# Patient Record
Sex: MALE | Race: WHITE | NOT HISPANIC OR LATINO | Employment: FULL TIME | RURAL
[De-identification: names, ages, dates, MRNs, and addresses within clinical notes are randomized per-mention and may not be internally consistent; named-entity substitution may affect disease eponyms.]

---

## 2022-06-07 ENCOUNTER — TELEPHONE (OUTPATIENT)
Dept: GASTROENTEROLOGY | Facility: CLINIC | Age: 47
End: 2022-06-07
Payer: OTHER GOVERNMENT

## 2022-06-08 ENCOUNTER — TELEPHONE (OUTPATIENT)
Dept: GASTROENTEROLOGY | Facility: CLINIC | Age: 47
End: 2022-06-08
Payer: OTHER GOVERNMENT

## 2022-06-30 ENCOUNTER — OFFICE VISIT (OUTPATIENT)
Dept: GASTROENTEROLOGY | Facility: CLINIC | Age: 47
End: 2022-06-30
Payer: OTHER GOVERNMENT

## 2022-06-30 VITALS
DIASTOLIC BLOOD PRESSURE: 76 MMHG | HEART RATE: 67 BPM | HEIGHT: 69 IN | OXYGEN SATURATION: 97 % | WEIGHT: 174.38 LBS | SYSTOLIC BLOOD PRESSURE: 120 MMHG | BODY MASS INDEX: 25.83 KG/M2

## 2022-06-30 DIAGNOSIS — R19.7 DIARRHEA, UNSPECIFIED TYPE: ICD-10-CM

## 2022-06-30 DIAGNOSIS — Z90.49 HISTORY OF COLON RESECTION: ICD-10-CM

## 2022-06-30 PROCEDURE — 99204 OFFICE O/P NEW MOD 45 MIN: CPT | Mod: ,,, | Performed by: NURSE PRACTITIONER

## 2022-06-30 PROCEDURE — 99204 PR OFFICE/OUTPT VISIT, NEW, LEVL IV, 45-59 MIN: ICD-10-PCS | Mod: ,,, | Performed by: NURSE PRACTITIONER

## 2022-06-30 RX ORDER — TESTOSTERONE CYPIONATE 200 MG/ML
400 INJECTION, SOLUTION INTRAMUSCULAR
COMMUNITY
Start: 2022-01-26

## 2022-06-30 RX ORDER — TRAZODONE HYDROCHLORIDE 100 MG/1
100 TABLET ORAL 2 TIMES DAILY
COMMUNITY
Start: 2022-01-12

## 2022-06-30 RX ORDER — OMEPRAZOLE 20 MG/1
20 CAPSULE, DELAYED RELEASE ORAL 2 TIMES DAILY
COMMUNITY
Start: 2022-05-09 | End: 2022-08-18 | Stop reason: ALTCHOICE

## 2022-06-30 RX ORDER — CYANOCOBALAMIN 1000 UG/ML
1000 INJECTION, SOLUTION INTRAMUSCULAR; SUBCUTANEOUS
COMMUNITY
Start: 2022-01-18

## 2022-06-30 RX ORDER — TIZANIDINE 4 MG/1
TABLET ORAL
COMMUNITY
Start: 2022-06-07

## 2022-06-30 RX ORDER — TRAMADOL HYDROCHLORIDE 50 MG/1
50 TABLET ORAL 3 TIMES DAILY PRN
COMMUNITY
Start: 2022-06-07

## 2022-06-30 RX ORDER — CYCLOBENZAPRINE HCL 10 MG
10 TABLET ORAL 3 TIMES DAILY PRN
COMMUNITY

## 2022-06-30 RX ORDER — DOXYCYCLINE 100 MG/1
100 CAPSULE ORAL DAILY
COMMUNITY
Start: 2022-06-07

## 2022-06-30 RX ORDER — PAROXETINE HYDROCHLORIDE 20 MG/1
20 TABLET, FILM COATED ORAL DAILY
COMMUNITY
Start: 2022-06-29

## 2022-06-30 RX ORDER — TAMSULOSIN HYDROCHLORIDE 0.4 MG/1
1 CAPSULE ORAL DAILY
COMMUNITY
Start: 2022-06-07

## 2022-06-30 RX ORDER — MONTELUKAST SODIUM 10 MG/1
10 TABLET ORAL
COMMUNITY

## 2022-06-30 NOTE — PROGRESS NOTES
"Tyrel Stafford is a 47 y.o. male here for Referral (Patient comes in for chronic diarrhea, bloating, lower abd pain, and gas.)        PCP: Primary Doctor No  Referring Provider: No referring provider defined for this encounter.     HPI:  Presents for report of chronic diarrhea. States this is an ongoing problem. History of colon resection in 2016. States his colon was "twisted" and that he became septic. Surgery was performed at [facility]. States that he has also had diarrhea but that this has gotten progressively worse. Denies hematochezia. States that bowel movements do have various colors. Reports occasional nocturnal diarrhea with fecal incontinence. Does have gas bloating.Reports that diarrhea is associated with lower abdominal pressure. No family history of IBD or Celiac disease in the immediate family. Had a colonoscopy with Dr. La 2-3 years ago.        ROS:  Review of Systems   Constitutional: Positive for appetite change (decreased). Negative for fatigue, fever and unexpected weight change.   HENT: Negative for trouble swallowing.    Gastrointestinal: Positive for diarrhea and fecal incontinence. Negative for abdominal pain (pressure lower abdomen), blood in stool, change in bowel habit, constipation, nausea, vomiting, reflux and change in bowel habit. Abdominal distention: gas bloating.   Neurological: Negative for light-headedness.   Psychiatric/Behavioral: The patient is not nervous/anxious.           PMHX:  has a past medical history of Arthritis, Broken ribs, Hypertension, and Lung injury.    PSHX:  has a past surgical history that includes Sinus surgery; Ankle surgery; Abdominal surgery; and vesectomy.    PFHX: family history includes Cancer in his father and mother; Diabetes in his father and mother; Hypertension in his father and mother.    PSlHX:  reports that he has quit smoking. His smoking use included vaping with nicotine. He has never used smokeless tobacco. He reports that he does not " "drink alcohol and does not use drugs.        Review of patient's allergies indicates:   Allergen Reactions    Codeine Other (See Comments)       Medication List with Changes/Refills   Current Medications    CYANOCOBALAMIN 1,000 MCG/ML INJECTION    Inject 1,000 mcg into the muscle every 30 days.    CYCLOBENZAPRINE (FLEXERIL) 10 MG TABLET    Take 10 mg by mouth 3 (three) times daily as needed.    DOXYCYCLINE (VIBRAMYCIN) 100 MG CAP    Take 100 mg by mouth once daily.    MONTELUKAST (SINGULAIR) 10 MG TABLET    Take 10 mg by mouth.    OMEPRAZOLE (PRILOSEC) 20 MG CAPSULE    Take 20 mg by mouth 2 (two) times daily.    PAROXETINE (PAXIL) 20 MG TABLET    Take 20 mg by mouth once daily.    TAMSULOSIN (FLOMAX) 0.4 MG CAP    Take 1 capsule by mouth once daily.    TESTOSTERONE CYPIONATE (DEPOTESTOTERONE CYPIONATE) 200 MG/ML INJECTION    Inject 400 mg into the muscle every 14 (fourteen) days.    TIZANIDINE (ZANAFLEX) 4 MG TABLET    Take by mouth.    TRAMADOL (ULTRAM) 50 MG TABLET    Take 50 mg by mouth 3 (three) times daily as needed.    TRAZODONE (DESYREL) 100 MG TABLET    Take 100 mg by mouth 2 (two) times daily.        Objective Findings:  Vital Signs:  /76   Pulse 67   Ht 5' 9" (1.753 m)   Wt 79.1 kg (174 lb 6.4 oz)   SpO2 97%   BMI 25.75 kg/m²  Body mass index is 25.75 kg/m².    Physical Exam:  Physical Exam  Vitals and nursing note reviewed.   Constitutional:       General: He is not in acute distress.     Appearance: Normal appearance. He is not ill-appearing.   HENT:      Mouth/Throat:      Mouth: Mucous membranes are moist.   Cardiovascular:      Rate and Rhythm: Normal rate.   Pulmonary:      Breath sounds: No wheezing, rhonchi or rales.   Abdominal:      General: Bowel sounds are normal. There is no distension.      Palpations: Abdomen is soft. There is no mass.      Tenderness: There is no abdominal tenderness. There is no guarding or rebound.      Hernia: No hernia is present.   Skin:     General: Skin " is warm and dry.      Coloration: Skin is not jaundiced or pale.      Findings: No bruising.   Neurological:      Mental Status: He is alert and oriented to person, place, and time.   Psychiatric:         Mood and Affect: Mood normal.          Labs:  No results found for: WBC, HGB, HCT, MCV, RDW, PLT, GRAN, LYMPH, MONO, EOS, BASO  No results found for: NA, K, CL, CO2, GLU, BUN, CREATININE, CALCIUM, PROT, ALBUMIN, BILITOT, ALKPHOS, AST, ALT      Imaging: No results found.      Assessment:  Tyrel Stafford is a 47 y.o. male here with:  1. Diarrhea, unspecified type    2. History of colon resection          Recommendations:  1. Stool studies and labs  2. Request records from Dr. La and [facility]  3. Avoid NSAID's    Follow up in about 6 weeks (around 8/11/2022).      Order summary:  Orders Placed This Encounter    C Diff Toxin by PCR    Enteric Pathogen Panel    Fecal leukocytes    Giardia antigen    Calprotectin, Stool    Occult blood x 1, stool    Fecal fat, qualitative    IgA    Tissue Transglutaminase Ab, IgA    Endomysial antibody, IgA titer    Gliadin (Deamidated) Ab, Eval    C-Reactive Protein       Thank you for allowing me to participate in the care of Tyrel Stafford.      ISI Moncada

## 2022-07-06 ENCOUNTER — TELEPHONE (OUTPATIENT)
Dept: GASTROENTEROLOGY | Facility: CLINIC | Age: 47
End: 2022-07-06
Payer: OTHER GOVERNMENT

## 2022-07-06 NOTE — TELEPHONE ENCOUNTER
Attempted to call results. Left message.    ----- Message from FARZANEH Sarmiento sent at 7/6/2022  4:40 PM CDT -----  No celiac disease on lab. Stool studies are not done.

## 2022-07-07 ENCOUNTER — TELEPHONE (OUTPATIENT)
Dept: GASTROENTEROLOGY | Facility: CLINIC | Age: 47
End: 2022-07-07
Payer: OTHER GOVERNMENT

## 2022-07-07 LAB — OCCULT BLOOD: NEGATIVE

## 2022-07-07 PROCEDURE — 82272 OCCULT BLD FECES 1-3 TESTS: CPT | Mod: ,,, | Performed by: CLINICAL MEDICAL LABORATORY

## 2022-07-07 PROCEDURE — 82272 OCCULT BLOOD X 1, STOOL: ICD-10-PCS | Mod: ,,, | Performed by: CLINICAL MEDICAL LABORATORY

## 2022-07-07 NOTE — TELEPHONE ENCOUNTER
Called results to patient. Verbalized understanding. Patient states he is taking stool sample to lab today.    ----- Message from FARZANEH Sarmiento sent at 7/6/2022  4:40 PM CDT -----  No celiac disease on lab. Stool studies are not done.

## 2022-07-12 ENCOUNTER — TELEPHONE (OUTPATIENT)
Dept: GASTROENTEROLOGY | Facility: CLINIC | Age: 47
End: 2022-07-12
Payer: OTHER GOVERNMENT

## 2022-07-12 DIAGNOSIS — Z90.49 HISTORY OF COLON RESECTION: ICD-10-CM

## 2022-07-12 DIAGNOSIS — R19.7 DIARRHEA, UNSPECIFIED TYPE: Primary | ICD-10-CM

## 2022-07-12 NOTE — TELEPHONE ENCOUNTER
Results and recommendations called to patient. Agreeable to colonoscopy. Placed on hold for  to schedule    ----- Message from FARZANEH Sarmiento sent at 7/12/2022 11:30 AM CDT -----  Abnormal fatty acid in stool. Recommend colonoscopy due to history of colon resection and chronic diarrhea

## 2022-08-11 ENCOUNTER — HOSPITAL ENCOUNTER (OUTPATIENT)
Dept: GASTROENTEROLOGY | Facility: HOSPITAL | Age: 47
Discharge: HOME | End: 2022-08-11
Attending: NURSE PRACTITIONER
Payer: OTHER GOVERNMENT

## 2022-08-11 ENCOUNTER — ANESTHESIA EVENT (OUTPATIENT)
Dept: GASTROENTEROLOGY | Facility: HOSPITAL | Age: 47
End: 2022-08-11
Payer: OTHER GOVERNMENT

## 2022-08-11 ENCOUNTER — ANESTHESIA (OUTPATIENT)
Dept: GASTROENTEROLOGY | Facility: HOSPITAL | Age: 47
End: 2022-08-11
Payer: OTHER GOVERNMENT

## 2022-08-11 VITALS
SYSTOLIC BLOOD PRESSURE: 120 MMHG | HEART RATE: 63 BPM | OXYGEN SATURATION: 97 % | RESPIRATION RATE: 13 BRPM | DIASTOLIC BLOOD PRESSURE: 69 MMHG | TEMPERATURE: 98 F

## 2022-08-11 DIAGNOSIS — Z90.49 HISTORY OF COLON RESECTION: ICD-10-CM

## 2022-08-11 DIAGNOSIS — R19.7 DIARRHEA, UNSPECIFIED TYPE: ICD-10-CM

## 2022-08-11 DIAGNOSIS — K57.30 COLON, DIVERTICULOSIS: ICD-10-CM

## 2022-08-11 DIAGNOSIS — K63.5 POLYP OF TRANSVERSE COLON, UNSPECIFIED TYPE: ICD-10-CM

## 2022-08-11 DIAGNOSIS — Z12.11 SCREENING FOR MALIGNANT NEOPLASM OF COLON: ICD-10-CM

## 2022-08-11 PROCEDURE — 43239 EGD BIOPSY SINGLE/MULTIPLE: CPT

## 2022-08-11 PROCEDURE — 00811 ANES LWR INTST NDSC NOS: CPT

## 2022-08-11 PROCEDURE — D9220A PRA ANESTHESIA: ICD-10-PCS | Mod: 33,,, | Performed by: NURSE ANESTHETIST, CERTIFIED REGISTERED

## 2022-08-11 PROCEDURE — 63600175 PHARM REV CODE 636 W HCPCS: Performed by: NURSE ANESTHETIST, CERTIFIED REGISTERED

## 2022-08-11 PROCEDURE — D9220A PRA ANESTHESIA: Mod: 33,,, | Performed by: NURSE ANESTHETIST, CERTIFIED REGISTERED

## 2022-08-11 PROCEDURE — 88305 SURGICAL PATHOLOGY: ICD-10-PCS | Mod: 26,XU,, | Performed by: PATHOLOGY

## 2022-08-11 PROCEDURE — 88360 TUMOR IMMUNOHISTOCHEM/MANUAL: CPT | Mod: 26,,, | Performed by: PATHOLOGY

## 2022-08-11 PROCEDURE — 88305 TISSUE EXAM BY PATHOLOGIST: CPT | Mod: 26,,, | Performed by: PATHOLOGY

## 2022-08-11 PROCEDURE — 37000009 HC ANESTHESIA EA ADD 15 MINS

## 2022-08-11 PROCEDURE — 45380 COLONOSCOPY AND BIOPSY: CPT | Mod: 33

## 2022-08-11 PROCEDURE — 27201423 OPTIME MED/SURG SUP & DEVICES STERILE SUPPLY

## 2022-08-11 PROCEDURE — 88305 TISSUE EXAM BY PATHOLOGIST: CPT | Mod: SUR | Performed by: INTERNAL MEDICINE

## 2022-08-11 PROCEDURE — 88360 SURGICAL PATHOLOGY: ICD-10-PCS | Mod: 26,XU,, | Performed by: PATHOLOGY

## 2022-08-11 PROCEDURE — 25000003 PHARM REV CODE 250: Performed by: NURSE ANESTHETIST, CERTIFIED REGISTERED

## 2022-08-11 PROCEDURE — 37000008 HC ANESTHESIA 1ST 15 MINUTES

## 2022-08-11 RX ORDER — SODIUM CHLORIDE 0.9 % (FLUSH) 0.9 %
10 SYRINGE (ML) INJECTION
Status: DISCONTINUED | OUTPATIENT
Start: 2022-08-11 | End: 2022-08-12 | Stop reason: HOSPADM

## 2022-08-11 RX ORDER — PROPOFOL 10 MG/ML
VIAL (ML) INTRAVENOUS
Status: DISCONTINUED | OUTPATIENT
Start: 2022-08-11 | End: 2022-08-11

## 2022-08-11 RX ORDER — LIDOCAINE HYDROCHLORIDE 20 MG/ML
INJECTION INTRAVENOUS
Status: DISCONTINUED | OUTPATIENT
Start: 2022-08-11 | End: 2022-08-11

## 2022-08-11 RX ADMIN — PROPOFOL 75 MG: 10 INJECTION, EMULSION INTRAVENOUS at 02:08

## 2022-08-11 RX ADMIN — LIDOCAINE HYDROCHLORIDE 75 MG: 20 INJECTION, SOLUTION INTRAVENOUS at 02:08

## 2022-08-11 RX ADMIN — SODIUM CHLORIDE: 9 INJECTION, SOLUTION INTRAVENOUS at 02:08

## 2022-08-11 NOTE — H&P
[facility] - Endoscopy  Gastroenterology  H&P    Patient Name: Tyrel Stafford  MRN: [id number]  Admission Date: 8/11/2022  Code Status: Full Code    Attending Provider: Obdulio Box MD  Primary Care Physician: Primary Doctor No  Principal Problem:<principal problem not specified>    Subjective:     History of Present Illness: Pt presents for colonoscopy due to diarrhea with history of partial colectomy at [facility]. His last colonscopy was at [facility] approximately two to three years ago.    Past Medical History:   Diagnosis Date    Arthritis     Broken ribs     Hypertension     Lung injury        Past Surgical History:   Procedure Laterality Date    ABDOMINAL SURGERY      ANKLE SURGERY      SINUS SURGERY      vesectomy         Review of patient's allergies indicates:   Allergen Reactions    Codeine Other (See Comments)     Family History     Problem Relation (Age of Onset)    Cancer Mother, Father    Diabetes Mother, Father    Hypertension Mother, Father        Tobacco Use    Smoking status: Former Smoker     Types: Vaping with nicotine    Smokeless tobacco: Never Used   Substance and Sexual Activity    Alcohol use: Never    Drug use: Never    Sexual activity: Not on file     Review of Systems   Respiratory: Negative.    Cardiovascular: Negative.    Gastrointestinal: Positive for diarrhea.     Objective:     Vital Signs (Most Recent):  Temp: 97.9 °F (36.6 °C) (08/11/22 1258)  Pulse: 70 (08/11/22 1258)  Resp: 10 (08/11/22 1258)  BP: 127/78 (08/11/22 1258)  SpO2: 96 % (08/11/22 1258) Vital Signs (24h Range):  Temp:  [97.9 °F (36.6 °C)] 97.9 °F (36.6 °C)  Pulse:  [70] 70  Resp:  [10] 10  SpO2:  [96 %] 96 %  BP: (127)/(78) 127/78        There is no height or weight on file to calculate BMI.    No intake or output data in the 24 hours ending 08/11/22 1418    Lines/Drains/Airways     Peripheral Intravenous Line  Duration                Peripheral IV - Single Lumen 08/11/22 1257 22 G Right Antecubital <1 day                 Physical Exam  Vitals reviewed.   Constitutional:       General: He is not in acute distress.     Appearance: Normal appearance. He is well-developed. He is not ill-appearing.   HENT:      Head: Normocephalic and atraumatic.      Nose: Nose normal.   Eyes:      Pupils: Pupils are equal, round, and reactive to light.   Cardiovascular:      Rate and Rhythm: Normal rate and regular rhythm.   Pulmonary:      Effort: Pulmonary effort is normal.      Breath sounds: Normal breath sounds. No wheezing.   Abdominal:      General: Abdomen is flat. Bowel sounds are normal. There is no distension.      Palpations: Abdomen is soft.      Tenderness: There is no abdominal tenderness. There is no guarding.   Skin:     General: Skin is warm and dry.      Coloration: Skin is not jaundiced.   Neurological:      Mental Status: He is alert.   Psychiatric:         Attention and Perception: Attention normal.         Mood and Affect: Affect normal.         Speech: Speech normal.         Behavior: Behavior is cooperative.      Comments: Pt was calm while speaking.         Significant Labs:  CBC: No results for input(s): WBC, HGB, HCT, PLT in the last 48 hours.  CMP: No results for input(s): GLU, CALCIUM, ALBUMIN, PROT, NA, K, CO2, CL, BUN, CREATININE, ALKPHOS, ALT, AST, BILITOT in the last 48 hours.    Significant Imaging:  Imaging results within the past 24 hours have been reviewed.    Assessment/Plan:     There are no hospital problems to display for this patient.        Imp: diarrhea; screen for colon neoplasm  Plan: colonoscopy    Obdulio Box MD  Gastroenterology  [facility] - Endoscopy

## 2022-08-11 NOTE — ANESTHESIA PREPROCEDURE EVALUATION
08/11/2022  Tyrel Stafford is a 47 y.o., male.      Pre-op Assessment    I have reviewed the Patient Summary Reports.    I have reviewed the NPO Status.   I have reviewed the Medications.     Review of Systems  Anesthesia Hx:  No problems with previous Anesthesia    Social:  Former Smoker    Hematology/Oncology:  Hematology Normal   Oncology Normal     EENT/Dental:EENT/Dental Normal   Cardiovascular:   Exercise tolerance: good Hypertension NYHA Classification II    Pulmonary:  Pulmonary Normal    Renal/:  Renal/ Normal     Hepatic/GI:   GERD    Musculoskeletal:  Musculoskeletal Normal    Neurological:  Neurology Normal    Endocrine:  Endocrine Normal    Dermatological:  Skin Normal    Psych:   depression        Past Medical History:   Diagnosis Date    Arthritis     Broken ribs     Hypertension     Lung injury        Physical Exam  General: Well nourished, Cooperative and Alert    Airway:  Mallampati: II   Mouth Opening: Normal  TM Distance: Normal  Tongue: Normal  Neck ROM: Normal ROM    Dental:  Intact    Chest/Lungs:  Normal Respiratory Rate    Heart:  Rate: Normal  Rhythm: Regular Rhythm        Anesthesia Plan  Type of Anesthesia, risks & benefits discussed:    Anesthesia Type: Gen Natural Airway  Intra-op Monitoring Plan: Standard ASA Monitors  Post Op Pain Control Plan: multimodal analgesia  Induction:  IV  Informed Consent: Informed consent signed with the Patient and all parties understand the risks and agree with anesthesia plan.  All questions answered. Patient consented to blood products? Yes  ASA Score: 2  Day of Surgery Review of History & Physical: I have interviewed and examined the patient. I have reviewed the patient's H&P dated:     Ready For Surgery From Anesthesia Perspective.      Current Outpatient Medications   Medication Sig    cyanocobalamin 1,000 mcg/mL injection Inject  1,000 mcg into the muscle every 30 days.    cyclobenzaprine (FLEXERIL) 10 MG tablet Take 10 mg by mouth 3 (three) times daily as needed.    doxycycline (VIBRAMYCIN) 100 MG Cap Take 100 mg by mouth once daily.    montelukast (SINGULAIR) 10 mg tablet Take 10 mg by mouth.    omeprazole (PRILOSEC) 20 MG capsule Take 20 mg by mouth 2 (two) times daily.    paroxetine (PAXIL) 20 MG tablet Take 20 mg by mouth once daily.    tamsulosin (FLOMAX) 0.4 mg Cap Take 1 capsule by mouth once daily.    testosterone cypionate (DEPOTESTOTERONE CYPIONATE) 200 mg/mL injection Inject 400 mg into the muscle every 14 (fourteen) days.    tiZANidine (ZANAFLEX) 4 MG tablet Take by mouth.    traMADoL (ULTRAM) 50 mg tablet Take 50 mg by mouth 3 (three) times daily as needed.    traZODone (DESYREL) 100 MG tablet Take 100 mg by mouth 2 (two) times daily.     No current facility-administered medications for this encounter.     Social History     Socioeconomic History    Marital status:    Tobacco Use    Smoking status: Former Smoker     Types: Vaping with nicotine    Smokeless tobacco: Never Used   Substance and Sexual Activity    Alcohol use: Never    Drug use: Never

## 2022-08-11 NOTE — ADDENDUM NOTE
Addendum  created 08/11/22 1529 by Nomi Esqueda CRNA    Intraprocedure Staff edited, Review and Sign - Ready for Procedure, Review and Sign - Undo

## 2022-08-11 NOTE — DISCHARGE INSTRUCTIONS
Procedure Date  8/11/22     Impression  Overall Impression: Some retained stool was present. No gross colitis was seen. Scattered diverticula were present in the sigmoid, descending and ascending colon. One diminutive polyp was removed with biopsy from the transverse colon.     Recommendation  Await pathology results  Repeat colonoscopy in 5 years, pending pathology results; high fiber diet.     Indication  History of colon resection, Diarrhea, unspecified type    DO NOT DRIVE FOR 24 HOURS OR OPERATE HEAVY MACHINERY.   DO NOT SIGN LEGAL DOCUMENTS.  DRINK PLENTY OF FLUIDS. RESUME DIET.

## 2022-08-11 NOTE — TRANSFER OF CARE
Anesthesia Transfer of Care Note    Patient: Tyrel Barrier    Procedure(s) Performed: *coloonoscopy  Patient location: GI    Anesthesia Type: general    Transport from OR: Transported from OR on room air with adequate spontaneous ventilation. Continuous ECG monitoring in transport. Continuous SpO2 monitoring in transport    Post pain: adequate analgesia    Post assessment: no apparent anesthetic complications    Post vital signs: stable    Level of consciousness: sedated and responds to stimulation    Nausea/Vomiting: no nausea/vomiting    Complications: none    Transfer of care protocol was followedComments: Good SV continue, NAD, VSS, RTRN      Last vitals:   Visit Vitals  /76 (BP Location: Right arm, Patient Position: Lying)   Pulse 70   Temp 36.7 °C (98 °F)   Resp 18   SpO2 98%

## 2022-08-11 NOTE — ANESTHESIA POSTPROCEDURE EVALUATION
Anesthesia Post Evaluation    Patient: Tyrel Barrier    Procedure(s) Performed: *colonoscopy    Final Anesthesia Type: general      Patient location during evaluation: GI PACU  Patient participation: Yes- Able to Participate  Level of consciousness: awake and alert  Post-procedure vital signs: reviewed and stable  Pain management: adequate  Airway patency: patent    PONV status at discharge: No PONV  Anesthetic complications: no      Cardiovascular status: blood pressure returned to baseline and hemodynamically stable  Respiratory status: spontaneous ventilation  Hydration status: euvolemic  Follow-up not needed.  Comments: Pt voices appreciation for care          Vitals Value Taken Time   /69 08/11/22 1508   Temp 36.7 °C (98 °F) 08/11/22 1437   Pulse 67 08/11/22 1511   Resp 12 08/11/22 1511   SpO2 97 % 08/11/22 1510   Vitals shown include unvalidated device data.      Event Time   Out of Recovery 15:21:00         Pain/Sharee Score: Sharee Score: 10 (8/11/2022  3:00 PM)

## 2022-08-12 LAB
DHEA SERPL-MCNC: NORMAL
ESTROGEN SERPL-MCNC: NORMAL PG/ML
INSULIN SERPL-ACNC: NORMAL U[IU]/ML
LAB AP GROSS DESCRIPTION: NORMAL
LAB AP LABORATORY NOTES: NORMAL
T3RU NFR SERPL: NORMAL %

## 2022-08-16 ENCOUNTER — TELEPHONE (OUTPATIENT)
Dept: GASTROENTEROLOGY | Facility: CLINIC | Age: 47
End: 2022-08-16
Payer: OTHER GOVERNMENT

## 2022-08-16 NOTE — TELEPHONE ENCOUNTER
Called patient to discuss results and verbalized understanding.      ----- Message from Obdulio Box MD sent at 8/12/2022  3:47 PM CDT -----  Place pt on list for colonoscopy in 5 years due to poor prep. The colon polyp was hyperplastic and the colon biopsies were normal. Offer pt a return to GI clinic appt to consider anti-diarrheal therapy.  He may use OTC fiber supplement and immodium, too.

## 2022-08-18 ENCOUNTER — OFFICE VISIT (OUTPATIENT)
Dept: GASTROENTEROLOGY | Facility: CLINIC | Age: 47
End: 2022-08-18
Payer: OTHER GOVERNMENT

## 2022-08-18 VITALS
HEIGHT: 69 IN | HEART RATE: 65 BPM | DIASTOLIC BLOOD PRESSURE: 84 MMHG | BODY MASS INDEX: 25.21 KG/M2 | OXYGEN SATURATION: 97 % | SYSTOLIC BLOOD PRESSURE: 134 MMHG | WEIGHT: 170.19 LBS

## 2022-08-18 DIAGNOSIS — K90.9 STEATORRHEA: ICD-10-CM

## 2022-08-18 DIAGNOSIS — R19.7 DIARRHEA, UNSPECIFIED TYPE: Primary | ICD-10-CM

## 2022-08-18 PROCEDURE — 99214 PR OFFICE/OUTPT VISIT, EST, LEVL IV, 30-39 MIN: ICD-10-PCS | Mod: ,,, | Performed by: NURSE PRACTITIONER

## 2022-08-18 PROCEDURE — 99214 OFFICE O/P EST MOD 30 MIN: CPT | Mod: ,,, | Performed by: NURSE PRACTITIONER

## 2022-08-18 RX ORDER — PANCRELIPASE 36000; 180000; 114000 [USP'U]/1; [USP'U]/1; [USP'U]/1
1 CAPSULE, DELAYED RELEASE PELLETS ORAL 3 TIMES DAILY
Qty: 90 CAPSULE | Refills: 1 | Status: SHIPPED | OUTPATIENT
Start: 2022-08-18 | End: 2022-10-17

## 2022-08-18 RX ORDER — DOXYCYCLINE 50 MG/1
CAPSULE ORAL
COMMUNITY

## 2022-08-18 RX ORDER — FAMOTIDINE 20 MG/1
20 TABLET, FILM COATED ORAL 2 TIMES DAILY
Qty: 60 TABLET | Refills: 1 | Status: SHIPPED | OUTPATIENT
Start: 2022-08-18 | End: 2022-09-15

## 2022-08-18 RX ORDER — SILDENAFIL CITRATE 20 MG/1
TABLET ORAL
COMMUNITY
Start: 2022-07-28 | End: 2023-06-19 | Stop reason: ALTCHOICE

## 2022-08-18 NOTE — PROGRESS NOTES
Tyrel Stafford is a 47 y.o. male here for Follow-up        PCP: Scout Foster  Referring Provider: No referring provider defined for this encounter.     HPI:  Presents for follow up after colonoscopy. No colitis on biopsy. He did have retained stool in the colon. Recommendation to repeat in 5 years. Fiber supplement and Imodium recommended. Stool studies reviewed. He does have abnormal fatty acid in the stool. Denies fish oil supplements. Diarrhea has been ongoing for 3 years. Has had colon resection in 2016.         ROS:  Review of Systems   Constitutional: Negative for activity change, appetite change, fatigue, fever and unexpected weight change.   HENT: Negative for trouble swallowing.    Respiratory: Negative for cough.    Cardiovascular: Negative for chest pain.   Gastrointestinal: Positive for diarrhea. Negative for abdominal distention, abdominal pain, blood in stool, change in bowel habit, constipation, nausea, vomiting, reflux and change in bowel habit.   Musculoskeletal: Negative for gait problem.   Integumentary:  Negative for color change.   Neurological: Negative for dizziness.   Psychiatric/Behavioral: Negative for sleep disturbance. The patient is not nervous/anxious.           PMHX:  has a past medical history of Arthritis, Broken ribs, Hypertension, Lung injury, Polyp of transverse colon (8/11/2022), and Screening for malignant neoplasm of colon (8/11/2022).    PSHX:  has a past surgical history that includes Sinus surgery; Ankle surgery; Abdominal surgery; and vesectomy.    PFHX: family history includes Cancer in his father and mother; Diabetes in his father and mother; Hypertension in his father and mother.    PSlHX:  reports that he has quit smoking. His smoking use included vaping with nicotine. He has never used smokeless tobacco. He reports that he does not drink alcohol and does not use drugs.        Review of patient's allergies indicates:   Allergen Reactions    Codeine Other (See  "Comments)    Hydrocodone        Medication List with Changes/Refills   New Medications    FAMOTIDINE (PEPCID) 20 MG TABLET    Take 1 tablet (20 mg total) by mouth 2 (two) times daily.    LIPASE-PROTEASE-AMYLASE (CREON) 36,000-114,000- 180,000 UNIT CPDR    Take 1 capsule by mouth 3 (three) times daily.   Current Medications    CYANOCOBALAMIN 1,000 MCG/ML INJECTION    Inject 1,000 mcg into the muscle every 30 days.    CYCLOBENZAPRINE (FLEXERIL) 10 MG TABLET    Take 10 mg by mouth 3 (three) times daily as needed.    DOXYCYCLINE (MONODOX) 50 MG CAP    TAKE 2 CAPSULES BY MOUTH DAILY FOR 14 DAYS    DOXYCYCLINE (VIBRAMYCIN) 100 MG CAP    Take 100 mg by mouth once daily.    MONTELUKAST (SINGULAIR) 10 MG TABLET    Take 10 mg by mouth.    PAROXETINE (PAXIL) 20 MG TABLET    Take 20 mg by mouth once daily.    SILDENAFIL (REVATIO) 20 MG TAB        TAMSULOSIN (FLOMAX) 0.4 MG CAP    Take 1 capsule by mouth once daily.    TESTOSTERONE CYPIONATE (DEPOTESTOTERONE CYPIONATE) 200 MG/ML INJECTION    Inject 400 mg into the muscle every 14 (fourteen) days.    TIZANIDINE (ZANAFLEX) 4 MG TABLET    Take by mouth.    TRAMADOL (ULTRAM) 50 MG TABLET    Take 50 mg by mouth 3 (three) times daily as needed.    TRAZODONE (DESYREL) 100 MG TABLET    Take 100 mg by mouth 2 (two) times daily.   Discontinued Medications    OMEPRAZOLE (PRILOSEC) 20 MG CAPSULE    Take 20 mg by mouth 2 (two) times daily.        Objective Findings:  Vital Signs:  /84   Pulse 65   Ht 5' 9" (1.753 m)   Wt 77.2 kg (170 lb 3.2 oz)   SpO2 97%   BMI 25.13 kg/m²  Body mass index is 25.13 kg/m².    Physical Exam:  Physical Exam  Vitals and nursing note reviewed.   Constitutional:       General: He is not in acute distress.     Appearance: Normal appearance.   Cardiovascular:      Rate and Rhythm: Normal rate.   Pulmonary:      Effort: Pulmonary effort is normal.      Breath sounds: No wheezing, rhonchi or rales.   Abdominal:      General: Abdomen is flat. Bowel sounds " are normal. There is no distension.      Palpations: Abdomen is soft. There is no mass.      Tenderness: There is no abdominal tenderness. There is no guarding or rebound.      Hernia: No hernia is present.   Skin:     General: Skin is warm and dry.      Coloration: Skin is not jaundiced or pale.      Findings: No bruising.   Neurological:      Mental Status: He is alert and oriented to person, place, and time.   Psychiatric:         Mood and Affect: Mood normal.          Labs:  No results found for: WBC, HGB, HCT, MCV, RDW, PLT, GRAN, LYMPH, MONO, EOS, BASO  No results found for: NA, K, CL, CO2, GLU, BUN, CREATININE, CALCIUM, PROT, ALBUMIN, BILITOT, ALKPHOS, AST, ALT      Imaging: Colonoscopy    Result Date: 8/11/2022  Procedure Date 8/11/22 Impression Overall      Some retained stool was present. No gross colitis was seen. Scattered diverticula were present in the sigmoid, descending and ascending colon. One diminutive polyp was removed with biopsy from the transverse colon. Recommendation Await pathology results Repeat colonoscopy in 5 years, pending pathology results; high fiber diet. Indication History of colon resection, Diarrhea, unspecified type Providers Attending: Obdulio Box MD Fellow:  Other Staff: Silvia Francisco RN, Lev Castaneda RN, Nomi Esqueda CRNA Medications Moderate sedation administered by anesthesia staff - See anesthesia record. Preprocedure A history and physical has been performed, and patient medication allergies have been reviewed. The patient's tolerance of previous anesthesia has been reviewed. The risks and benefits of the procedure and the sedation options and risks were discussed with the patient. All questions were answered and informed consent obtained. ASA Score: ASA 2 - Patient with mild systemic disease with no functional limitations Mallampati Airway Score: II (hard and soft palate, upper portion of tonsils anduvula visible) Details of the Procedure The patient  underwent monitored anesthesia care, which was administered by an anesthesia professional. The patient's heart rate, blood pressure, level of consciousness, respirations, oxygen, ECG and ETCO2 were monitored throughout the procedure. A digital rectal exam was performed. The scope was introduced through the anus and advanced to the cecum. Retroflexion was performed in the rectum. The quality of bowel preparation was evaluated using the [place] Bowel Preparation Scale with scores of: right colon = 2, transverse colon = 2, left colon = 2. The total BBPS score was 6. Bowel prep was adequate. The patient's estimated blood loss was minimal (<5 mL). The procedure was not difficult. The patient tolerated the procedure well. There were no apparent complications. Scope: Colonoscope Scope Serial: [id number] Events Procedure Events Event Event Time Procedure Events Event Event Time ENDO SCOPE IN TIME 8/11/2022  2:21 PM ENDO CECUM REACHED 8/11/2022  2:28 PM ENDO SCOPE OUT TIME 8/11/2022  2:36 PM CECAL WITHDRAWAL TIME: 8m 14s Findings Diverticula in the ascending colon, descending colon and sigmoid colon; no bleeding was identified One polyp measuring smaller than 5 mm in the transverse colon; performed cold forceps biopsy         Assessment:  Tyrel Stafford is a 47 y.o. male here with:  1. Diarrhea, unspecified type    2. Steatorrhea          Recommendations:  1. Creon one with each meal, trial to see if this improves diarrhea. Patient agrees.  2. May take Fibercon supplement and Imodium  3. Avoid NSAID's    Follow up in about 4 weeks (around 9/15/2022).      Order summary:  Orders Placed This Encounter    famotidine (PEPCID) 20 MG tablet    lipase-protease-amylase (CREON) 36,000-114,000- 180,000 unit CpDR       Thank you for allowing me to participate in the care of Tyrel Stafford.      ISI Moncada

## 2022-08-18 NOTE — PATIENT INSTRUCTIONS
Fibercon tablets  Avoid Advil , Motrin, Ibuprofen, Aleve    Stop Omeprazole. Start Pepcid 20 mg daily    Creon 1 capsule with each meal, tree times per day

## 2022-09-15 ENCOUNTER — OFFICE VISIT (OUTPATIENT)
Dept: GASTROENTEROLOGY | Facility: CLINIC | Age: 47
End: 2022-09-15
Payer: OTHER GOVERNMENT

## 2022-09-15 VITALS
SYSTOLIC BLOOD PRESSURE: 113 MMHG | BODY MASS INDEX: 25.77 KG/M2 | OXYGEN SATURATION: 96 % | WEIGHT: 174 LBS | HEIGHT: 69 IN | HEART RATE: 63 BPM | DIASTOLIC BLOOD PRESSURE: 65 MMHG

## 2022-09-15 DIAGNOSIS — K90.9 STEATORRHEA: ICD-10-CM

## 2022-09-15 DIAGNOSIS — R19.7 DIARRHEA, UNSPECIFIED TYPE: Primary | ICD-10-CM

## 2022-09-15 PROCEDURE — 99214 OFFICE O/P EST MOD 30 MIN: CPT | Mod: ,,, | Performed by: NURSE PRACTITIONER

## 2022-09-15 PROCEDURE — 99214 PR OFFICE/OUTPT VISIT, EST, LEVL IV, 30-39 MIN: ICD-10-PCS | Mod: ,,, | Performed by: NURSE PRACTITIONER

## 2022-09-15 NOTE — PROGRESS NOTES
Tyrel Stafford is a 47 y.o. male here for Follow-up        PCP: Scout Foster  Referring Provider: No referring provider defined for this encounter.     HPI:  Presents for follow up for diarrhea. He did have abnormal fatty acid in his stool. He was started on Creon. He reports that he feels that this is helping his diarrhea somewhat. He has had diarrhea for the last five years. Colonoscopy 8/11/22, no colitis. He did have retained stool. Fiber supplements and Imodium were recommended. He reports that the Imodium does not work. Reports that the Pepcid did not help his reflux symptoms and that he has changed back to Omeprazole. Celiac is negative.    Follow-up  Pertinent negatives include no abdominal pain, change in bowel habit, chest pain, coughing, fatigue, fever, nausea or vomiting.       ROS:  Review of Systems   Constitutional:  Negative for activity change, appetite change, fatigue, fever and unexpected weight change.   HENT:  Negative for trouble swallowing.    Respiratory:  Negative for cough.    Cardiovascular:  Negative for chest pain.   Gastrointestinal:  Positive for diarrhea and reflux. Negative for abdominal distention, abdominal pain, blood in stool, change in bowel habit, constipation, nausea, vomiting, fecal incontinence and change in bowel habit.   Musculoskeletal:  Negative for gait problem.   Integumentary:  Negative for color change.   Neurological:  Negative for dizziness.   Psychiatric/Behavioral:  The patient is not nervous/anxious.         PMHX:  has a past medical history of Arthritis, Broken ribs, Hypertension, Lung injury, Polyp of transverse colon (8/11/2022), and Screening for malignant neoplasm of colon (8/11/2022).    PSHX:  has a past surgical history that includes Sinus surgery; Ankle surgery; Abdominal surgery; and vesectomy.    PFHX: family history includes Cancer in his father and mother; Diabetes in his father and mother; Hypertension in his father and mother.    PSlHX:   "reports that he has quit smoking. His smoking use included vaping with nicotine. He has never used smokeless tobacco. He reports that he does not drink alcohol and does not use drugs.        Review of patient's allergies indicates:   Allergen Reactions    Codeine Other (See Comments)    Hydrocodone        Medication List with Changes/Refills   Current Medications    CYANOCOBALAMIN 1,000 MCG/ML INJECTION    Inject 1,000 mcg into the muscle every 30 days.    CYCLOBENZAPRINE (FLEXERIL) 10 MG TABLET    Take 10 mg by mouth 3 (three) times daily as needed.    DOXYCYCLINE (MONODOX) 50 MG CAP    TAKE 2 CAPSULES BY MOUTH DAILY FOR 14 DAYS    DOXYCYCLINE (VIBRAMYCIN) 100 MG CAP    Take 100 mg by mouth once daily.    LIPASE-PROTEASE-AMYLASE (CREON) 36,000-114,000- 180,000 UNIT CPDR    Take 1 capsule by mouth 3 (three) times daily.    MONTELUKAST (SINGULAIR) 10 MG TABLET    Take 10 mg by mouth.    PAROXETINE (PAXIL) 20 MG TABLET    Take 20 mg by mouth once daily.    SILDENAFIL (REVATIO) 20 MG TAB        TAMSULOSIN (FLOMAX) 0.4 MG CAP    Take 1 capsule by mouth once daily.    TESTOSTERONE CYPIONATE (DEPOTESTOTERONE CYPIONATE) 200 MG/ML INJECTION    Inject 400 mg into the muscle every 14 (fourteen) days.    TIZANIDINE (ZANAFLEX) 4 MG TABLET    Take by mouth.    TRAMADOL (ULTRAM) 50 MG TABLET    Take 50 mg by mouth 3 (three) times daily as needed.    TRAZODONE (DESYREL) 100 MG TABLET    Take 100 mg by mouth 2 (two) times daily.   Discontinued Medications    FAMOTIDINE (PEPCID) 20 MG TABLET    Take 1 tablet (20 mg total) by mouth 2 (two) times daily.        Objective Findings:  Vital Signs:  /65   Pulse 63   Ht 5' 9" (1.753 m)   Wt 78.9 kg (174 lb)   SpO2 96%   BMI 25.70 kg/m²  Body mass index is 25.7 kg/m².    Physical Exam:  Physical Exam  Vitals and nursing note reviewed.   Constitutional:       General: He is not in acute distress.     Appearance: Normal appearance. He is not ill-appearing.   HENT:      Mouth/Throat: "      Mouth: Mucous membranes are moist.   Cardiovascular:      Rate and Rhythm: Normal rate.   Pulmonary:      Effort: Pulmonary effort is normal.      Breath sounds: No wheezing, rhonchi or rales.   Abdominal:      General: Bowel sounds are normal. There is no distension.      Palpations: Abdomen is soft. There is no mass.      Tenderness: There is no abdominal tenderness. There is no guarding or rebound.      Hernia: No hernia is present.   Skin:     General: Skin is warm and dry.      Coloration: Skin is not jaundiced or pale.   Neurological:      Mental Status: He is alert and oriented to person, place, and time.   Psychiatric:         Mood and Affect: Mood normal.        Labs:  No results found for: WBC, HGB, HCT, MCV, RDW, PLT, GRAN, LYMPH, MONO, EOS, BASO  No results found for: NA, K, CL, CO2, GLU, BUN, CREATININE, CALCIUM, PROT, ALBUMIN, BILITOT, ALKPHOS, AST, ALT      Imaging: No results found.      Assessment:  Tyrel Stafford is a 47 y.o. male here with:  1. Diarrhea, unspecified type    2. Steatorrhea          Recommendations:  1. Continue Creon  2. Avoid NSAID's  3. Fiber supplement may be used as well for diarrhea  4. Continue Omeprazole  Avoid spicy, greasy foods  Avoid caffeine, citric acid, chocolate, peppermint, and carbonated drinks  Do not lay down within 3 hours of eating  Exercise 150 minutes per week  Increase fluid to 64 ounces daily  Avoid antiinflammatory medications such as motrin, advil, aleve, ibuprofen, and BC powder      Follow up in about 4 months (around 1/15/2023).      Order summary:       Thank you for allowing me to participate in the care of Tyrel Stafford.      ISI Moncada

## 2023-04-05 ENCOUNTER — OUTSIDE PLACE OF SERVICE (OUTPATIENT)
Dept: CARDIOLOGY | Facility: HOSPITAL | Age: 48
End: 2023-04-05
Payer: OTHER GOVERNMENT

## 2023-04-05 PROCEDURE — 93227 XTRNL ECG REC<48 HR R&I: CPT | Mod: ,,, | Performed by: INTERNAL MEDICINE

## 2023-04-05 PROCEDURE — 93227 PR EXT ECG RECORD CONTIN 48 HR, PHYS REVIEW&INTERP: ICD-10-PCS | Mod: ,,, | Performed by: INTERNAL MEDICINE

## 2023-04-24 ENCOUNTER — OFFICE VISIT (OUTPATIENT)
Dept: CARDIOLOGY | Facility: CLINIC | Age: 48
End: 2023-04-24
Payer: OTHER GOVERNMENT

## 2023-04-24 VITALS
SYSTOLIC BLOOD PRESSURE: 110 MMHG | DIASTOLIC BLOOD PRESSURE: 80 MMHG | WEIGHT: 168 LBS | RESPIRATION RATE: 18 BRPM | OXYGEN SATURATION: 97 % | HEIGHT: 69 IN | HEART RATE: 85 BPM | BODY MASS INDEX: 24.88 KG/M2

## 2023-04-24 DIAGNOSIS — R00.2 INTERMITTENT PALPITATIONS: ICD-10-CM

## 2023-04-24 DIAGNOSIS — R07.9 CHEST PAIN, UNSPECIFIED TYPE: Primary | ICD-10-CM

## 2023-04-24 DIAGNOSIS — Z72.0 TOBACCO ABUSE DISORDER: ICD-10-CM

## 2023-04-24 DIAGNOSIS — I10 ESSENTIAL HYPERTENSION: ICD-10-CM

## 2023-04-24 PROCEDURE — 99204 PR OFFICE/OUTPT VISIT, NEW, LEVL IV, 45-59 MIN: ICD-10-PCS | Mod: S$PBB,,, | Performed by: INTERNAL MEDICINE

## 2023-04-24 PROCEDURE — 99215 OFFICE O/P EST HI 40 MIN: CPT | Mod: PBBFAC | Performed by: INTERNAL MEDICINE

## 2023-04-24 PROCEDURE — 99204 OFFICE O/P NEW MOD 45 MIN: CPT | Mod: S$PBB,,, | Performed by: INTERNAL MEDICINE

## 2023-04-24 PROCEDURE — 93010 ELECTROCARDIOGRAM REPORT: CPT | Mod: S$PBB,,, | Performed by: INTERNAL MEDICINE

## 2023-04-24 PROCEDURE — 93010 EKG 12-LEAD: ICD-10-PCS | Mod: S$PBB,,, | Performed by: INTERNAL MEDICINE

## 2023-04-24 PROCEDURE — 93005 ELECTROCARDIOGRAM TRACING: CPT | Mod: PBBFAC | Performed by: INTERNAL MEDICINE

## 2023-04-24 RX ORDER — FLUCONAZOLE 100 MG/1
TABLET ORAL
COMMUNITY
Start: 2022-12-02

## 2023-04-24 RX ORDER — METOPROLOL SUCCINATE 25 MG/1
25 TABLET, EXTENDED RELEASE ORAL DAILY
COMMUNITY
Start: 2023-04-10 | End: 2023-07-13 | Stop reason: SDUPTHER

## 2023-04-24 RX ORDER — METHYLPREDNISOLONE 4 MG/1
TABLET ORAL
COMMUNITY
Start: 2023-02-15

## 2023-04-24 RX ORDER — OMEPRAZOLE 20 MG/1
20 CAPSULE, DELAYED RELEASE ORAL 2 TIMES DAILY
COMMUNITY
Start: 2023-02-16

## 2023-04-24 RX ORDER — PAROXETINE HYDROCHLORIDE 40 MG/1
40 TABLET, FILM COATED ORAL DAILY
COMMUNITY
Start: 2023-02-28

## 2023-04-24 RX ORDER — ONDANSETRON 4 MG/1
TABLET, ORALLY DISINTEGRATING ORAL
COMMUNITY
Start: 2023-02-03

## 2023-04-24 RX ORDER — LOSARTAN POTASSIUM 50 MG/1
50 TABLET ORAL DAILY
COMMUNITY
Start: 2023-03-30

## 2023-04-24 RX ORDER — AMOXICILLIN AND CLAVULANATE POTASSIUM 500; 125 MG/1; MG/1
TABLET, FILM COATED ORAL
COMMUNITY
Start: 2023-03-22

## 2023-04-24 RX ORDER — AZITHROMYCIN 250 MG/1
TABLET, FILM COATED ORAL
COMMUNITY
Start: 2023-04-10

## 2023-05-17 ENCOUNTER — HOSPITAL ENCOUNTER (OUTPATIENT)
Dept: CARDIOLOGY | Facility: HOSPITAL | Age: 48
Discharge: HOME | End: 2023-05-17
Attending: INTERNAL MEDICINE
Payer: OTHER GOVERNMENT

## 2023-05-17 VITALS
SYSTOLIC BLOOD PRESSURE: 108 MMHG | DIASTOLIC BLOOD PRESSURE: 68 MMHG | BODY MASS INDEX: 24.88 KG/M2 | HEIGHT: 69 IN | WEIGHT: 168 LBS | HEART RATE: 51 BPM

## 2023-05-17 VITALS — BODY MASS INDEX: 24.88 KG/M2 | HEIGHT: 69 IN | WEIGHT: 168 LBS

## 2023-05-17 DIAGNOSIS — R07.9 CHEST PAIN, UNSPECIFIED TYPE: ICD-10-CM

## 2023-05-17 PROCEDURE — 93306 TTE W/DOPPLER COMPLETE: CPT

## 2023-05-17 PROCEDURE — 93018 CV STRESS TEST I&R ONLY: CPT | Mod: ,,, | Performed by: INTERNAL MEDICINE

## 2023-05-17 PROCEDURE — 93306 ECHO (CUPID ONLY): ICD-10-PCS | Mod: 26,,, | Performed by: INTERNAL MEDICINE

## 2023-05-17 PROCEDURE — 93018 EXERCISE STRESS - EKG (CUPID ONLY): ICD-10-PCS | Mod: ,,, | Performed by: INTERNAL MEDICINE

## 2023-05-17 PROCEDURE — 93016 CV STRESS TEST SUPVJ ONLY: CPT | Mod: ,,, | Performed by: INTERNAL MEDICINE

## 2023-05-17 PROCEDURE — 93016 EXERCISE STRESS - EKG (CUPID ONLY): ICD-10-PCS | Mod: ,,, | Performed by: INTERNAL MEDICINE

## 2023-05-17 PROCEDURE — 93017 CV STRESS TEST TRACING ONLY: CPT

## 2023-05-17 PROCEDURE — 93306 TTE W/DOPPLER COMPLETE: CPT | Mod: 26,,, | Performed by: INTERNAL MEDICINE

## 2023-05-18 LAB
AORTIC VALVE CUSP SEPERATION: 2.36 CM
AV INDEX (PROSTH): 0.84
AV MEAN GRADIENT: 3 MMHG
AV PEAK GRADIENT: 7 MMHG
AV REGURGITATION PRESSURE HALF TIME: 651 MS
AV VALVE AREA: 2.62 CM2
AV VELOCITY RATIO: 0.77
BSA FOR ECHO PROCEDURE: 1.93 M2
CV ECHO LV RWT: 0.29 CM
CV STRESS BASE HR: 51 BPM
DIASTOLIC BLOOD PRESSURE: 68 MMHG
DOP CALC AO PEAK VEL: 1.3 M/S
DOP CALC AO VTI: 26.58 CM
DOP CALC LVOT AREA: 3.1 CM2
DOP CALC LVOT DIAMETER: 2 CM
DOP CALC LVOT PEAK VEL: 1 M/S
DOP CALC LVOT STROKE VOLUME: 69.77 CM3
DOP CALCLVOT PEAK VEL VTI: 22.22 CM
E WAVE DECELERATION TIME: 238 MSEC
ECHO LV POSTERIOR WALL: 0.74 CM (ref 0.6–1.1)
EJECTION FRACTION: 50 %
FRACTIONAL SHORTENING: 23 % (ref 28–44)
INTERVENTRICULAR SEPTUM: 0.91 CM (ref 0.6–1.1)
LEFT ATRIUM SIZE: 3.46 CM
LEFT INTERNAL DIMENSION IN SYSTOLE: 3.97 CM (ref 2.1–4)
LEFT VENTRICLE DIASTOLIC VOLUME INDEX: 65.96 ML/M2
LEFT VENTRICLE DIASTOLIC VOLUME: 126.64 ML
LEFT VENTRICLE MASS INDEX: 77 G/M2
LEFT VENTRICLE SYSTOLIC VOLUME INDEX: 35.8 ML/M2
LEFT VENTRICLE SYSTOLIC VOLUME: 68.76 ML
LEFT VENTRICULAR INTERNAL DIMENSION IN DIASTOLE: 5.15 CM (ref 3.5–6)
LEFT VENTRICULAR MASS: 148.56 G
LVOT MG: 2.2 MMHG
MV PEAK E VEL: 0.55 M/S
OHS CV CPX 1 MINUTE RECOVERY HEART RATE: 87 BPM
OHS CV CPX 85 PERCENT MAX PREDICTED HEART RATE MALE: 146
OHS CV CPX MAX PREDICTED HEART RATE: 172
OHS CV CPX PATIENT IS FEMALE: 0
OHS CV CPX PATIENT IS MALE: 1
OHS CV CPX PEAK DIASTOLIC BLOOD PRESSURE: 60 MMHG
OHS CV CPX PEAK HEAR RATE: 127 BPM
OHS CV CPX PEAK RATE PRESSURE PRODUCT: NORMAL
OHS CV CPX PEAK SYSTOLIC BLOOD PRESSURE: 132 MMHG
OHS CV CPX PERCENT MAX PREDICTED HEART RATE ACHIEVED: 74
OHS CV CPX RATE PRESSURE PRODUCT PRESENTING: 5508
PISA AR MAX VEL: 3.71 M/S
PISA TR MAX VEL: 1.86 M/S
RIGHT ATRIAL AREA: 10.9 CM2
RIGHT VENTRICULAR END-DIASTOLIC DIMENSION: 2.96 CM
SYSTOLIC BLOOD PRESSURE: 108 MMHG
TR MAX PG: 14 MMHG
TRICUSPID ANNULAR PLANE SYSTOLIC EXCURSION: 2.1 CM

## 2023-05-29 PROBLEM — R07.9 CHEST PAIN: Status: ACTIVE | Noted: 2023-05-29

## 2023-05-29 PROBLEM — R00.2 INTERMITTENT PALPITATIONS: Status: ACTIVE | Noted: 2023-05-29

## 2023-05-29 PROBLEM — Z72.0 TOBACCO ABUSE DISORDER: Status: ACTIVE | Noted: 2023-05-29

## 2023-05-29 PROBLEM — I10 ESSENTIAL HYPERTENSION: Status: ACTIVE | Noted: 2023-05-29

## 2023-05-29 NOTE — PROGRESS NOTES
Cardiology Clinic Note:    PCP: Scout Foster MD    REFERRING PHYSICIAN:     CHIEF COMPLAINT: Chest pain    HISTORY OF PRESENT ILLNESS:  Tyrel Stafford is a 48 y.o. male who presents for evaluation of chest pain, mid epigastric, associated with heart racing, lasts seconds to minutes, improves with rest, not associated with nausea, diaphoresis or shortness of breath.  Pt reports he can provoke palpitations and chest pain depending on the number of Mountain Dews he drinks.  He reports he is active, chest pain is not provoked by exercise.  Symptoms can be provoked by vaping.       Review of Systems:  Review of Systems   Constitutional: Negative for diaphoresis, malaise/fatigue, night sweats and weight gain.   HENT:  Negative for congestion, ear pain, hearing loss, nosebleeds and sore throat.    Eyes:  Negative for blurred vision, double vision, pain, photophobia and visual disturbance.   Cardiovascular:  Positive for chest pain and palpitations. Negative for claudication, dyspnea on exertion, irregular heartbeat, leg swelling, near-syncope, orthopnea and syncope.   Respiratory:  Negative for cough, shortness of breath, sleep disturbances due to breathing, snoring and wheezing.    Endocrine: Negative for cold intolerance, heat intolerance, polydipsia, polyphagia and polyuria.   Hematologic/Lymphatic: Negative for bleeding problem. Does not bruise/bleed easily.   Skin:  Negative for dry skin, flushing, itching, rash and skin cancer.   Musculoskeletal:  Negative for arthritis, back pain, falls, joint pain, muscle cramps, muscle weakness and myalgias.   Gastrointestinal:  Positive for heartburn. Negative for abdominal pain, change in bowel habit, constipation, diarrhea, dysphagia, nausea and vomiting.   Genitourinary:  Negative for bladder incontinence, dysuria, flank pain, frequency and nocturia.   Neurological:  Negative for dizziness, focal weakness, headaches, light-headedness, loss of balance, numbness,  paresthesias and seizures.   Psychiatric/Behavioral:  Negative for depression, memory loss and substance abuse. The patient is not nervous/anxious.    Allergic/Immunologic: Negative for environmental allergies.        PAST MEDICAL HISTORY:  Past Medical History:   Diagnosis Date    Arthritis     Broken ribs     Hypertension     Lung injury     Polyp of transverse colon 8/11/2022    Screening for malignant neoplasm of colon 8/11/2022       PAST SURGICAL HISTORY:  Past Surgical History:   Procedure Laterality Date    ABDOMINAL SURGERY      ANKLE SURGERY      SINUS SURGERY      vesectomy         SOCIAL HISTORY:  Social History     Socioeconomic History    Marital status:    Tobacco Use    Smoking status: Former     Types: Vaping with nicotine    Smokeless tobacco: Never   Substance and Sexual Activity    Alcohol use: Never    Drug use: Never       FAMILY HISTORY:  Family History   Problem Relation Age of Onset    Diabetes Mother     Cancer Mother     Hypertension Mother     Cancer Father     Diabetes Father     Hypertension Father        ALLERGIES:  Allergies as of 04/24/2023 - Reviewed 04/24/2023   Allergen Reaction Noted    Codeine Other (See Comments) 09/15/2014    Hydrocodone  08/18/2022         MEDICATIONS:  Current Outpatient Medications on File Prior to Visit   Medication Sig Dispense Refill    cyanocobalamin 1,000 mcg/mL injection Inject 1,000 mcg into the muscle every 30 days.      losartan (COZAAR) 50 MG tablet       omeprazole (PRILOSEC) 20 MG capsule       paroxetine (PAXIL) 20 MG tablet Take 20 mg by mouth once daily.      testosterone cypionate (DEPOTESTOTERONE CYPIONATE) 200 mg/mL injection Inject 400 mg into the muscle every 14 (fourteen) days.      tiZANidine (ZANAFLEX) 4 MG tablet Take by mouth.      traZODone (DESYREL) 100 MG tablet Take 100 mg by mouth 2 (two) times daily.      amoxicillin-clavulanate 500-125mg (AUGMENTIN) 500-125 mg Tab       azithromycin (Z-ADRY) 250 MG tablet        "cyclobenzaprine (FLEXERIL) 10 MG tablet Take 10 mg by mouth 3 (three) times daily as needed.      doxycycline (MONODOX) 50 MG Cap TAKE 2 CAPSULES BY MOUTH DAILY FOR 14 DAYS      doxycycline (VIBRAMYCIN) 100 MG Cap Take 100 mg by mouth once daily.      fluconazole (DIFLUCAN) 100 MG tablet       methylPREDNISolone (MEDROL DOSEPACK) 4 mg tablet       metoprolol succinate (TOPROL-XL) 25 MG 24 hr tablet       montelukast (SINGULAIR) 10 mg tablet Take 10 mg by mouth.      ondansetron (ZOFRAN-ODT) 4 MG TbDL       paroxetine (PAXIL) 40 MG tablet       sildenafil (REVATIO) 20 mg Tab       tamsulosin (FLOMAX) 0.4 mg Cap Take 1 capsule by mouth once daily.      traMADoL (ULTRAM) 50 mg tablet Take 50 mg by mouth 3 (three) times daily as needed.       No current facility-administered medications on file prior to visit.          PHYSICAL EXAM:  Blood pressure 110/80, pulse 85, resp. rate 18, height 5' 9" (1.753 m), weight 76.2 kg (168 lb), SpO2 97 %.  Wt Readings from Last 3 Encounters:   05/17/23 76.2 kg (168 lb)   05/17/23 76.2 kg (168 lb)   04/24/23 76.2 kg (168 lb)      Body mass index is 24.81 kg/m².    Physical Exam  Vitals and nursing note reviewed.   Constitutional:       Appearance: Normal appearance. He is normal weight.   HENT:      Head: Normocephalic and atraumatic.      Right Ear: External ear normal.      Left Ear: External ear normal.   Eyes:      General: No scleral icterus.        Right eye: No discharge.         Left eye: No discharge.      Extraocular Movements: Extraocular movements intact.      Conjunctiva/sclera: Conjunctivae normal.      Pupils: Pupils are equal, round, and reactive to light.   Cardiovascular:      Rate and Rhythm: Normal rate and regular rhythm.      Pulses: Normal pulses.      Heart sounds: Normal heart sounds. No murmur heard.    No friction rub. No gallop.   Pulmonary:      Effort: Pulmonary effort is normal.      Breath sounds: Normal breath sounds. No wheezing, rhonchi or rales. "   Chest:      Chest wall: No tenderness.   Abdominal:      General: Abdomen is flat. Bowel sounds are normal. There is no distension.      Palpations: Abdomen is soft.      Tenderness: There is no abdominal tenderness. There is no guarding or rebound.   Musculoskeletal:         General: No swelling or tenderness. Normal range of motion.      Cervical back: Normal range of motion and neck supple.   Skin:     General: Skin is warm and dry.      Findings: No erythema or rash.   Neurological:      General: No focal deficit present.      Mental Status: He is alert and oriented to person, place, and time.      Cranial Nerves: No cranial nerve deficit.      Motor: No weakness.      Gait: Gait normal.   Psychiatric:         Mood and Affect: Mood normal.         Behavior: Behavior normal.         Thought Content: Thought content normal.         Judgment: Judgment normal.        LABS REVIEWED:  No results found for: WBC, RBC, HGB, HCT, MCV, MCH, MCHC, RDW, PLT, MPV, NRBC, INR  No results found for: NA, K, CL, CO2, BUN, MG, PHOS  No results found for: CPK, AST, ALT  No results found for: GLU, HGBA1C  No results found for: CHOL, HDL, LDL, TRIG, CHOLHDL    CARDIAC STUDIES REVIEWED:    OTHER IMAGING STUDIES REVIEWED:        ASSESSMENT:   Chest pain, unspecified type  -     EKG 12-lead; Future  -     Exercise Stress - EKG; Future  -     Echo; Future; Expected date: 04/24/2023          PLAN:  1. Chest pain, atypical, associated with palpitations, will order treadmill stress test, to evaluate for ischemic substrate, echo to evaluate for structural heart disease, start ASA 81 mg q d  2. Caffine abuse, recommend decrease to two caffinated beverages daily, consider Zio if symptoms persist  3. Nicotine abuse: discontinue vaping.  4. Hypertension, better controlled on current meds

## 2023-06-14 ENCOUNTER — OFFICE VISIT (OUTPATIENT)
Dept: CARDIOLOGY | Facility: CLINIC | Age: 48
End: 2023-06-14
Payer: OTHER GOVERNMENT

## 2023-06-14 VITALS
HEART RATE: 66 BPM | OXYGEN SATURATION: 92 % | DIASTOLIC BLOOD PRESSURE: 80 MMHG | SYSTOLIC BLOOD PRESSURE: 120 MMHG | RESPIRATION RATE: 18 BRPM | HEIGHT: 69 IN | WEIGHT: 170 LBS | BODY MASS INDEX: 25.18 KG/M2

## 2023-06-14 DIAGNOSIS — Z01.818 PREOP EXAMINATION: Primary | ICD-10-CM

## 2023-06-14 DIAGNOSIS — R94.39 ABNORMAL STRESS TEST: ICD-10-CM

## 2023-06-14 DIAGNOSIS — R00.2 INTERMITTENT PALPITATIONS: ICD-10-CM

## 2023-06-14 DIAGNOSIS — I10 ESSENTIAL HYPERTENSION: ICD-10-CM

## 2023-06-14 DIAGNOSIS — I20.0 UNSTABLE ANGINA PECTORIS: ICD-10-CM

## 2023-06-14 PROCEDURE — 99215 PR OFFICE/OUTPT VISIT, EST, LEVL V, 40-54 MIN: ICD-10-PCS | Mod: S$PBB,,, | Performed by: INTERNAL MEDICINE

## 2023-06-14 PROCEDURE — 99214 OFFICE O/P EST MOD 30 MIN: CPT | Mod: PBBFAC | Performed by: INTERNAL MEDICINE

## 2023-06-14 PROCEDURE — 99215 OFFICE O/P EST HI 40 MIN: CPT | Mod: S$PBB,,, | Performed by: INTERNAL MEDICINE

## 2023-06-15 DIAGNOSIS — R07.9 CHEST PAIN, UNSPECIFIED TYPE: Primary | ICD-10-CM

## 2023-06-15 RX ORDER — SODIUM CHLORIDE 0.9 % (FLUSH) 0.9 %
2 SYRINGE (ML) INJECTION
Status: CANCELLED | OUTPATIENT
Start: 2023-06-28

## 2023-06-15 RX ORDER — NITROGLYCERIN 0.4 MG/1
0.4 TABLET SUBLINGUAL EVERY 5 MIN PRN
COMMUNITY

## 2023-06-19 PROBLEM — R94.39 ABNORMAL STRESS TEST: Status: ACTIVE | Noted: 2023-06-19

## 2023-06-19 PROBLEM — Z01.818 PREOP EXAMINATION: Status: ACTIVE | Noted: 2023-06-19

## 2023-06-19 RX ORDER — NITROGLYCERIN 0.4 MG/1
0.4 TABLET SUBLINGUAL EVERY 5 MIN PRN
Qty: 25 TABLET | Refills: 0 | Status: SHIPPED | OUTPATIENT
Start: 2023-06-19 | End: 2024-06-18

## 2023-06-19 NOTE — PROGRESS NOTES
Cardiology Clinic Note:    PCP: Scout Foster MD    REFERRING PHYSICIAN:     CHIEF COMPLAINT: Chest pain    HISTORY OF PRESENT ILLNESS:  Tyrel Stafford is a 48 y.o. male who reports he continues to have episodes of chest pain, mid epigastric, associated with heart racing, lasts seconds to minutes, improves with rest, not associated with nausea, diaphoresis or shortness of breath.  Pt reports he can provoke palpitations and chest pain have been less frequent with decrease in  the number of Mountain Dews he drinks.  He reports he is active, chest pain is not provoked by exercise.  Symptoms can be provoked by vaping.       Review of Systems:  Review of Systems   Constitutional: Negative for diaphoresis, malaise/fatigue, night sweats and weight gain.   HENT:  Negative for congestion, ear pain, hearing loss, nosebleeds and sore throat.    Eyes:  Negative for blurred vision, double vision, pain, photophobia and visual disturbance.   Cardiovascular:  Positive for chest pain and palpitations. Negative for claudication, dyspnea on exertion, irregular heartbeat, leg swelling, near-syncope, orthopnea and syncope.   Respiratory:  Negative for cough, shortness of breath, sleep disturbances due to breathing, snoring and wheezing.    Endocrine: Negative for cold intolerance, heat intolerance, polydipsia, polyphagia and polyuria.   Hematologic/Lymphatic: Negative for bleeding problem. Does not bruise/bleed easily.   Skin:  Negative for dry skin, flushing, itching, rash and skin cancer.   Musculoskeletal:  Negative for arthritis, back pain, falls, joint pain, muscle cramps, muscle weakness and myalgias.   Gastrointestinal:  Positive for heartburn. Negative for abdominal pain, change in bowel habit, constipation, diarrhea, dysphagia, nausea and vomiting.   Genitourinary:  Negative for bladder incontinence, dysuria, flank pain, frequency and nocturia.   Neurological:  Negative for dizziness, focal weakness, headaches,  light-headedness, loss of balance, numbness, paresthesias and seizures.   Psychiatric/Behavioral:  Negative for depression, memory loss and substance abuse. The patient is not nervous/anxious.    Allergic/Immunologic: Negative for environmental allergies.        PAST MEDICAL HISTORY:  Past Medical History:   Diagnosis Date    Arthritis     Broken ribs     Hypertension     Lung injury     Polyp of transverse colon 8/11/2022    Screening for malignant neoplasm of colon 8/11/2022       PAST SURGICAL HISTORY:  Past Surgical History:   Procedure Laterality Date    ABDOMINAL SURGERY      ANKLE SURGERY      SINUS SURGERY      vesectomy         SOCIAL HISTORY:  Social History     Socioeconomic History    Marital status:    Tobacco Use    Smoking status: Former     Types: Vaping with nicotine    Smokeless tobacco: Never   Substance and Sexual Activity    Alcohol use: Never    Drug use: Never       FAMILY HISTORY:  Family History   Problem Relation Age of Onset    Diabetes Mother     Cancer Mother     Hypertension Mother     Cancer Father     Diabetes Father     Hypertension Father        ALLERGIES:  Allergies as of 06/14/2023 - Reviewed 06/14/2023   Allergen Reaction Noted    Codeine Other (See Comments) 09/15/2014    Hydrocodone  08/18/2022         MEDICATIONS:  Current Outpatient Medications on File Prior to Visit   Medication Sig Dispense Refill    cyanocobalamin 1,000 mcg/mL injection Inject 1,000 mcg into the muscle every 30 days.      losartan (COZAAR) 50 MG tablet       metoprolol succinate (TOPROL-XL) 25 MG 24 hr tablet       omeprazole (PRILOSEC) 20 MG capsule       paroxetine (PAXIL) 40 MG tablet       testosterone cypionate (DEPOTESTOTERONE CYPIONATE) 200 mg/mL injection Inject 400 mg into the muscle every 14 (fourteen) days.      tiZANidine (ZANAFLEX) 4 MG tablet Take by mouth.      traMADoL (ULTRAM) 50 mg tablet Take 50 mg by mouth 3 (three) times daily as needed.      traZODone (DESYREL) 100 MG tablet  "Take 100 mg by mouth 2 (two) times daily.      amoxicillin-clavulanate 500-125mg (AUGMENTIN) 500-125 mg Tab       azithromycin (Z-ADRY) 250 MG tablet       cyclobenzaprine (FLEXERIL) 10 MG tablet Take 10 mg by mouth 3 (three) times daily as needed.      doxycycline (MONODOX) 50 MG Cap TAKE 2 CAPSULES BY MOUTH DAILY FOR 14 DAYS      doxycycline (VIBRAMYCIN) 100 MG Cap Take 100 mg by mouth once daily.      fluconazole (DIFLUCAN) 100 MG tablet       methylPREDNISolone (MEDROL DOSEPACK) 4 mg tablet       montelukast (SINGULAIR) 10 mg tablet Take 10 mg by mouth.      nitroGLYCERIN (NITROSTAT) 0.4 MG SL tablet Place 0.4 mg under the tongue every 5 (five) minutes as needed for Chest pain.      ondansetron (ZOFRAN-ODT) 4 MG TbDL       paroxetine (PAXIL) 20 MG tablet Take 20 mg by mouth once daily.      sildenafil (REVATIO) 20 mg Tab       tamsulosin (FLOMAX) 0.4 mg Cap Take 1 capsule by mouth once daily.       No current facility-administered medications on file prior to visit.          PHYSICAL EXAM:  Blood pressure 120/80, pulse 66, resp. rate 18, height 5' 9" (1.753 m), weight 77.1 kg (170 lb), SpO2 (!) 92 %.  Wt Readings from Last 3 Encounters:   06/14/23 77.1 kg (170 lb)   05/17/23 76.2 kg (168 lb)   05/17/23 76.2 kg (168 lb)      Body mass index is 25.1 kg/m².    Physical Exam  Vitals and nursing note reviewed.   Constitutional:       Appearance: Normal appearance. He is normal weight.   HENT:      Head: Normocephalic and atraumatic.      Right Ear: External ear normal.      Left Ear: External ear normal.   Eyes:      General: No scleral icterus.        Right eye: No discharge.         Left eye: No discharge.      Extraocular Movements: Extraocular movements intact.      Conjunctiva/sclera: Conjunctivae normal.      Pupils: Pupils are equal, round, and reactive to light.   Cardiovascular:      Rate and Rhythm: Normal rate and regular rhythm.      Pulses: Normal pulses.      Heart sounds: Normal heart sounds. No murmur " heard.    No friction rub. No gallop.   Pulmonary:      Effort: Pulmonary effort is normal.      Breath sounds: Normal breath sounds. No wheezing, rhonchi or rales.   Chest:      Chest wall: No tenderness.   Abdominal:      General: Abdomen is flat. Bowel sounds are normal. There is no distension.      Palpations: Abdomen is soft.      Tenderness: There is no abdominal tenderness. There is no guarding or rebound.   Musculoskeletal:         General: No swelling or tenderness. Normal range of motion.      Cervical back: Normal range of motion and neck supple.   Skin:     General: Skin is warm and dry.      Findings: No erythema or rash.   Neurological:      General: No focal deficit present.      Mental Status: He is alert and oriented to person, place, and time.      Cranial Nerves: No cranial nerve deficit.      Motor: No weakness.      Gait: Gait normal.   Psychiatric:         Mood and Affect: Mood normal.         Behavior: Behavior normal.         Thought Content: Thought content normal.         Judgment: Judgment normal.        LABS REVIEWED:  Lab Results   Component Value Date    WBC 7.41 06/14/2023    RBC 4.59 (L) 06/14/2023    HGB 14.0 06/14/2023    HCT 42.4 06/14/2023    MCV 92.4 06/14/2023    MCH 30.5 06/14/2023    MCHC 33.0 06/14/2023    RDW 13.2 06/14/2023     06/14/2023    MPV 10.9 06/14/2023    NRBC 0.0 06/14/2023     Lab Results   Component Value Date     06/14/2023    K 4.0 06/14/2023     06/14/2023    CO2 33 (H) 06/14/2023    BUN 9 06/14/2023     No results found for: CPK, AST, ALT  Lab Results   Component Value Date    GLU 87 06/14/2023     No results found for: CHOL, HDL, LDL, TRIG, CHOLHDL    CARDIAC STUDIES REVIEWED:    OTHER IMAGING STUDIES REVIEWED:        ASSESSMENT:   Preop examination  -     CBC Auto Differential; Future; Expected date: 06/14/2023  -     Basic Metabolic Panel; Future; Expected date: 06/14/2023          PLAN:  1. Chest pain, atypical, associated with  palpitations, non-diagnostic treadmill stress test, unable to achieve 85% MPHR , however provoked chest pain at lower level of exertion, study is abnormal.  Discussed options, recommendation for LHC/poss, risks and benefits discussed, pt elects to proceed.  Started NtG sl, explained use, will schedule for LHC/poss early next week.   2. Caffine abuse, recommend decrease to two caffinated beverages daily, consider Zio if symptoms persist  3. Nicotine abuse: discontinue vaping.  4. Hypertension, better controlled on current meds

## 2023-06-28 ENCOUNTER — HOSPITAL ENCOUNTER (OUTPATIENT)
Facility: HOSPITAL | Age: 48
Discharge: HOME | End: 2023-06-28
Attending: INTERNAL MEDICINE | Admitting: INTERNAL MEDICINE
Payer: OTHER GOVERNMENT

## 2023-06-28 VITALS
DIASTOLIC BLOOD PRESSURE: 81 MMHG | OXYGEN SATURATION: 96 % | RESPIRATION RATE: 15 BRPM | TEMPERATURE: 98 F | HEART RATE: 63 BPM | SYSTOLIC BLOOD PRESSURE: 136 MMHG

## 2023-06-28 DIAGNOSIS — Z72.0 TOBACCO ABUSE DISORDER: ICD-10-CM

## 2023-06-28 DIAGNOSIS — R94.39 ABNORMAL STRESS TEST: Primary | ICD-10-CM

## 2023-06-28 DIAGNOSIS — Z01.818 PREOP EXAMINATION: ICD-10-CM

## 2023-06-28 DIAGNOSIS — I10 ESSENTIAL HYPERTENSION: ICD-10-CM

## 2023-06-28 DIAGNOSIS — R07.9 CHEST PAIN, UNSPECIFIED TYPE: ICD-10-CM

## 2023-06-28 LAB — CATH EF QUANTITATIVE: 50 %

## 2023-06-28 PROCEDURE — 99152 MOD SED SAME PHYS/QHP 5/>YRS: CPT | Mod: ,,, | Performed by: INTERNAL MEDICINE

## 2023-06-28 PROCEDURE — 63600175 PHARM REV CODE 636 W HCPCS: Performed by: INTERNAL MEDICINE

## 2023-06-28 PROCEDURE — 25500020 PHARM REV CODE 255: Performed by: INTERNAL MEDICINE

## 2023-06-28 PROCEDURE — 93458 L HRT ARTERY/VENTRICLE ANGIO: CPT | Mod: 26,,, | Performed by: INTERNAL MEDICINE

## 2023-06-28 PROCEDURE — 99152 MOD SED SAME PHYS/QHP 5/>YRS: CPT | Performed by: INTERNAL MEDICINE

## 2023-06-28 PROCEDURE — 99153 MOD SED SAME PHYS/QHP EA: CPT | Performed by: INTERNAL MEDICINE

## 2023-06-28 PROCEDURE — 93458 L HRT ARTERY/VENTRICLE ANGIO: CPT | Performed by: INTERNAL MEDICINE

## 2023-06-28 PROCEDURE — C1894 INTRO/SHEATH, NON-LASER: HCPCS | Performed by: INTERNAL MEDICINE

## 2023-06-28 PROCEDURE — 93005 ELECTROCARDIOGRAM TRACING: CPT

## 2023-06-28 PROCEDURE — 25000003 PHARM REV CODE 250: Performed by: INTERNAL MEDICINE

## 2023-06-28 PROCEDURE — 93010 ELECTROCARDIOGRAM REPORT: CPT | Mod: ,,, | Performed by: INTERNAL MEDICINE

## 2023-06-28 PROCEDURE — C1760 CLOSURE DEV, VASC: HCPCS | Performed by: INTERNAL MEDICINE

## 2023-06-28 PROCEDURE — 93458 PR CATH PLACE/CORON ANGIO, IMG SUPER/INTERP,W LEFT HEART VENTRICULOGRAPHY: ICD-10-PCS | Mod: 26,,, | Performed by: INTERNAL MEDICINE

## 2023-06-28 PROCEDURE — 93010 EKG 12-LEAD: ICD-10-PCS | Mod: ,,, | Performed by: INTERNAL MEDICINE

## 2023-06-28 PROCEDURE — 99152 PR MOD CONSCIOUS SEDATION, SAME PHYS, 5+ YRS, FIRST 15 MIN: ICD-10-PCS | Mod: ,,, | Performed by: INTERNAL MEDICINE

## 2023-06-28 PROCEDURE — 27201423 OPTIME MED/SURG SUP & DEVICES STERILE SUPPLY: Performed by: INTERNAL MEDICINE

## 2023-06-28 DEVICE — ANGIO-SEAL VIP VASCULAR CLOSURE DEVICE
Type: IMPLANTABLE DEVICE | Site: GROIN | Status: FUNCTIONAL
Brand: ANGIO-SEAL

## 2023-06-28 RX ORDER — FENTANYL CITRATE 50 UG/ML
INJECTION, SOLUTION INTRAMUSCULAR; INTRAVENOUS
Status: DISCONTINUED | OUTPATIENT
Start: 2023-06-28 | End: 2023-06-28 | Stop reason: HOSPADM

## 2023-06-28 RX ORDER — LIDOCAINE HYDROCHLORIDE 10 MG/ML
INJECTION INFILTRATION; PERINEURAL
Status: DISCONTINUED | OUTPATIENT
Start: 2023-06-28 | End: 2023-06-28 | Stop reason: HOSPADM

## 2023-06-28 RX ORDER — ONDANSETRON 4 MG/1
8 TABLET, ORALLY DISINTEGRATING ORAL EVERY 8 HOURS PRN
Status: DISCONTINUED | OUTPATIENT
Start: 2023-06-28 | End: 2023-06-28 | Stop reason: HOSPADM

## 2023-06-28 RX ORDER — MIDAZOLAM HYDROCHLORIDE 1 MG/ML
INJECTION INTRAMUSCULAR; INTRAVENOUS
Status: DISCONTINUED | OUTPATIENT
Start: 2023-06-28 | End: 2023-06-28 | Stop reason: HOSPADM

## 2023-06-28 RX ORDER — ACETAMINOPHEN 325 MG/1
650 TABLET ORAL EVERY 4 HOURS PRN
Status: DISCONTINUED | OUTPATIENT
Start: 2023-06-28 | End: 2023-06-28 | Stop reason: HOSPADM

## 2023-06-28 RX ORDER — SODIUM CHLORIDE 9 MG/ML
INJECTION, SOLUTION INTRAVENOUS
Status: DISCONTINUED | OUTPATIENT
Start: 2023-06-28 | End: 2023-06-28 | Stop reason: HOSPADM

## 2023-06-28 RX ORDER — SODIUM CHLORIDE 0.9 % (FLUSH) 0.9 %
2 SYRINGE (ML) INJECTION
Status: DISCONTINUED | OUTPATIENT
Start: 2023-06-28 | End: 2023-06-28 | Stop reason: HOSPADM

## 2023-06-28 RX ORDER — SODIUM CHLORIDE 450 MG/100ML
INJECTION, SOLUTION INTRAVENOUS CONTINUOUS
Status: DISCONTINUED | OUTPATIENT
Start: 2023-06-28 | End: 2023-06-28 | Stop reason: HOSPADM

## 2023-06-28 RX ORDER — HEPARIN SOD,PORCINE/0.9 % NACL 1000/500ML
INTRAVENOUS SOLUTION INTRAVENOUS
Status: DISCONTINUED | OUTPATIENT
Start: 2023-06-28 | End: 2023-06-28 | Stop reason: HOSPADM

## 2023-06-28 RX ADMIN — ACETAMINOPHEN 650 MG: 325 TABLET ORAL at 12:06

## 2023-07-13 ENCOUNTER — OFFICE VISIT (OUTPATIENT)
Dept: CARDIOLOGY | Facility: CLINIC | Age: 48
End: 2023-07-13
Payer: OTHER GOVERNMENT

## 2023-07-13 VITALS
OXYGEN SATURATION: 98 % | HEIGHT: 69 IN | SYSTOLIC BLOOD PRESSURE: 84 MMHG | DIASTOLIC BLOOD PRESSURE: 50 MMHG | WEIGHT: 165.63 LBS | HEART RATE: 63 BPM | BODY MASS INDEX: 24.53 KG/M2

## 2023-07-13 DIAGNOSIS — R00.2 PALPITATIONS: ICD-10-CM

## 2023-07-13 DIAGNOSIS — I10 ESSENTIAL HYPERTENSION: ICD-10-CM

## 2023-07-13 DIAGNOSIS — R07.9 CHEST PAIN, UNSPECIFIED TYPE: ICD-10-CM

## 2023-07-13 DIAGNOSIS — I25.42 SPONTANEOUS DISSECTION OF CORONARY ARTERY: ICD-10-CM

## 2023-07-13 DIAGNOSIS — R06.02 SOB (SHORTNESS OF BREATH): Primary | ICD-10-CM

## 2023-07-13 PROCEDURE — 99215 OFFICE O/P EST HI 40 MIN: CPT | Mod: PBBFAC | Performed by: NURSE PRACTITIONER

## 2023-07-13 PROCEDURE — 99204 OFFICE O/P NEW MOD 45 MIN: CPT | Mod: S$PBB,,, | Performed by: NURSE PRACTITIONER

## 2023-07-13 PROCEDURE — 99204 PR OFFICE/OUTPT VISIT, NEW, LEVL IV, 45-59 MIN: ICD-10-PCS | Mod: S$PBB,,, | Performed by: NURSE PRACTITIONER

## 2023-07-13 RX ORDER — METOPROLOL SUCCINATE 25 MG/1
25 TABLET, EXTENDED RELEASE ORAL DAILY
Qty: 30 TABLET | Refills: 2 | Status: SHIPPED | OUTPATIENT
Start: 2023-07-13 | End: 2023-09-19

## 2023-07-13 NOTE — DISCHARGE SUMMARY
Physician Discharge Summary:    Patient Name: Tyrel Stafford MRN: [id number] Age: 48 y.o. : 1975    Admit date: 2023    Discharge date: 2023    Treatment Team: Surgeon: Orlin Ro DO    Admitting Diagnosis: Chest pain, unspecified type [R07.9]  Discharge Diagnosis: chest pain      Consults: none  Significant Diagnostic Studies:    History and Hospital Course:  For full History and Physical, please see office note     Other Relevant labs:    Discharge plan:home    Discharge Condition: Stable    Medications:   Discharge Medication List as of 2023  1:20 PM        CONTINUE these medications which have NOT CHANGED    Details   amoxicillin-clavulanate 500-125mg (AUGMENTIN) 500-125 mg Tab Starting Wed 3/22/2023, Historical Med      azithromycin (Z-ADRY) 250 MG tablet Starting Mon 4/10/2023, Historical Med      cyanocobalamin 1,000 mcg/mL injection Inject 1,000 mcg into the muscle every 30 days., Starting 2022, Historical Med      cyclobenzaprine (FLEXERIL) 10 MG tablet Take 10 mg by mouth 3 (three) times daily as needed., Historical Med      doxycycline (MONODOX) 50 MG Cap TAKE 2 CAPSULES BY MOUTH DAILY FOR 14 DAYS, Historical Med      doxycycline (VIBRAMYCIN) 100 MG Cap Take 100 mg by mouth once daily., Starting 2022, Historical Med      fluconazole (DIFLUCAN) 100 MG tablet Starting 2022, Historical Med      losartan (COZAAR) 50 MG tablet Starting Thu 3/30/2023, Historical Med      methylPREDNISolone (MEDROL DOSEPACK) 4 mg tablet Historical Med      metoprolol succinate (TOPROL-XL) 25 MG 24 hr tablet Starting Mon 4/10/2023, Historical Med      montelukast (SINGULAIR) 10 mg tablet Take 10 mg by mouth., Historical Med      !! nitroGLYCERIN (NITROSTAT) 0.4 MG SL tablet Place 1 tablet (0.4 mg total) under the tongue every 5 (five) minutes as needed for Chest pain., Starting 2023, Until 2024 at 2359, Phone In      !! nitroGLYCERIN (NITROSTAT) 0.4 MG SL  tablet Place 0.4 mg under the tongue every 5 (five) minutes as needed for Chest pain., Historical Med      omeprazole (PRILOSEC) 20 MG capsule Starting Thu 2/16/2023, Historical Med      ondansetron (ZOFRAN-ODT) 4 MG TbDL Starting Fri 2/3/2023, Historical Med      !! paroxetine (PAXIL) 20 MG tablet Take 20 mg by mouth once daily., Starting Wed 6/29/2022, Historical Med      !! paroxetine (PAXIL) 40 MG tablet Starting Tue 2/28/2023, Historical Med      tamsulosin (FLOMAX) 0.4 mg Cap Take 1 capsule by mouth once daily., Starting Tue 6/7/2022, Historical Med      testosterone cypionate (DEPOTESTOTERONE CYPIONATE) 200 mg/mL injection Inject 400 mg into the muscle every 14 (fourteen) days., Starting Wed 1/26/2022, Historical Med      tiZANidine (ZANAFLEX) 4 MG tablet Take by mouth., Starting Tue 6/7/2022, Historical Med      traMADoL (ULTRAM) 50 mg tablet Take 50 mg by mouth 3 (three) times daily as needed., Starting Tue 6/7/2022, Historical Med      traZODone (DESYREL) 100 MG tablet Take 100 mg by mouth 2 (two) times daily., Starting Wed 1/12/2022, Historical Med       !! - Potential duplicate medications found. Please discuss with provider.          Discharge Instructions:  Activity:Not to lift over ten pounds for three days  Diet: Lower animal fat, low salt diet    Study results pending at time of discharge:    Issues to be addressed at follow-up:    Follow-up:  Future Appointments   Date Time Provider Department Center   7/13/2023  1:15 PM ALIA Kimball OB [facility]       [unfilled]

## 2023-07-13 NOTE — ASSESSMENT & PLAN NOTE
"Chest pain has improved since [facility] . He states, "just feels sore".   "

## 2023-07-13 NOTE — ASSESSMENT & PLAN NOTE
bp 120/80 mmHg 6/14/2023 when seen by Dr. Ro   Today 84/50 mmHg- asymptomatic  Patient states no issues with left femoral access site.     CBC, CMP today  Suspect volume depletion.   Patient went back to work last week and works in an un-air conditioned environment. The heat index last week most days was >100 degrees F.   If he is not dehydrated will decrease losartan to 25 mg po daily and continue metoprolol succinate at 25 mg po daily.

## 2023-07-13 NOTE — ASSESSMENT & PLAN NOTE
[facility] 6/28/2023- Dr. Ro  Resolving spontaneous dissection of the prox RCA

## 2023-07-13 NOTE — PROGRESS NOTES
"PCP: Scout Foster MD    Referring Provider:     Subjective:   Tyrel Stafford is a 48 y.o. male with hx of single vessel CAD with a resolving spontaneous dissection of  the prox RCA and AV fistula of the prox D1 found no [facility] 6/28/2023 by Dr. Ro and HTN, who presents for HD follow up after [facility]. The patient states that he has had an improvement in the chest pain and palpitations since the [facility], he just "feels sore". He has some SOB with exertion and lower ext edema that improves at HS.     4/24/2023 (Dr. Ro)  presents for evaluation of chest pain, mid epigastric, associated with heart racing, lasts seconds to minutes, improves with rest, not associated with nausea, diaphoresis or shortness of breath.  Pt reports he can provoke palpitations and chest pain depending on the number of Mountain Dews he drinks.  He reports he is active, chest pain is not provoked by exercise.  Symptoms can be provoked by vaping.     Fhx:  Family History   Problem Relation Age of Onset    Diabetes Mother     Cancer Mother     Hypertension Mother     Cancer Father     Diabetes Father     Hypertension Father      Shx:   Social History     Socioeconomic History    Marital status:    Tobacco Use    Smoking status: Former     Types: Vaping with nicotine    Smokeless tobacco: Never   Substance and Sexual Activity    Alcohol use: Never    Drug use: Never       EKG   Results for orders placed or performed during the hospital encounter of 06/28/23   EKG 12-lead    Collection Time: 06/28/23  7:34 AM    Narrative    Test Reason : Z01.818,    Vent. Rate : 046 BPM     Atrial Rate : 046 BPM     P-R Int : 124 ms          QRS Dur : 084 ms      QT Int : 466 ms       P-R-T Axes : 062 074 009 degrees     QTc Int : 407 ms    Sinus bradycardia  Otherwise normal ECG  When compared with ECG of 24-APR-2023 10:19,  Vent. rate has decreased BY  25 BPM  Confirmed by Josef Ro DO ([id number]) on 7/1/2023 9:06:08 PM    Referred By: JOSEF RO           " Confirmed By:Orlin Ro DO      ECHO Results for orders placed during the hospital encounter of 05/17/23    Echo    Interpretation Summary  · The left ventricle is normal in size with low normal systolic function.  · The estimated ejection fraction is 50%.  · Normal left ventricular diastolic function.  · Normal right ventricular size with normal right ventricular systolic function.  · Mild tricuspid regurgitation.  · Mild pulmonic regurgitation.    [facility] Results for orders placed during the hospital encounter of 06/28/23    Cardiac catheterization    Conclusion    The ejection fraction was calculated to be 50%.    There was mild left ventricular systolic dysfunction.    The left ventricular end diastolic pressure was normal.    The pre-procedure left ventricular end diastolic pressure was 4.    The estimated blood loss was none.    There was single vessel coronary artery disease.    The procedure log was documented by No documenter listed and verified by Orlin Ro DO.    Date: 6/28/2023  Time: 10:04 AM    Mild LV systolic impairment with anterior apical hypokinesis  Resolving spontaneous dissection proximal RCA  AV fisutula proximal D!  Small native vessels.        Lab Results   Component Value Date     06/14/2023    K 4.0 06/14/2023     06/14/2023    CO2 33 (H) 06/14/2023    BUN 9 06/14/2023    CREATININE 1.16 06/14/2023    CALCIUM 9.9 06/14/2023    ANIONGAP 3 (L) 06/14/2023       No results found for: CHOL  No results found for: HDL  No results found for: LDLCALC  No results found for: TRIG  No results found for: CHOLHDL    Lab Results   Component Value Date    WBC 7.41 06/14/2023    HGB 14.0 06/14/2023    HCT 42.4 06/14/2023    MCV 92.4 06/14/2023     06/14/2023           Current Outpatient Medications:     losartan (COZAAR) 50 MG tablet, Take 50 mg by mouth once daily., Disp: , Rfl:     nitroGLYCERIN (NITROSTAT) 0.4 MG SL tablet, Place 1 tablet (0.4 mg total) under the tongue every 5  (five) minutes as needed for Chest pain., Disp: 25 tablet, Rfl: 0    omeprazole (PRILOSEC) 20 MG capsule, Take 20 mg by mouth 2 (two) times a day., Disp: , Rfl:     paroxetine (PAXIL) 40 MG tablet, Take 40 mg by mouth once daily., Disp: , Rfl:     traZODone (DESYREL) 100 MG tablet, Take 100 mg by mouth 2 (two) times daily., Disp: , Rfl:     amoxicillin-clavulanate 500-125mg (AUGMENTIN) 500-125 mg Tab, , Disp: , Rfl:     azithromycin (Z-ADRY) 250 MG tablet, , Disp: , Rfl:     cyanocobalamin 1,000 mcg/mL injection, Inject 1,000 mcg into the muscle every 30 days., Disp: , Rfl:     cyclobenzaprine (FLEXERIL) 10 MG tablet, Take 10 mg by mouth 3 (three) times daily as needed., Disp: , Rfl:     doxycycline (MONODOX) 50 MG Cap, TAKE 2 CAPSULES BY MOUTH DAILY FOR 14 DAYS, Disp: , Rfl:     doxycycline (VIBRAMYCIN) 100 MG Cap, Take 100 mg by mouth once daily., Disp: , Rfl:     fluconazole (DIFLUCAN) 100 MG tablet, , Disp: , Rfl:     methylPREDNISolone (MEDROL DOSEPACK) 4 mg tablet, , Disp: , Rfl:     metoprolol succinate (TOPROL-XL) 25 MG 24 hr tablet, Take 1 tablet (25 mg total) by mouth once daily., Disp: 30 tablet, Rfl: 2    montelukast (SINGULAIR) 10 mg tablet, Take 10 mg by mouth., Disp: , Rfl:     nitroGLYCERIN (NITROSTAT) 0.4 MG SL tablet, Place 0.4 mg under the tongue every 5 (five) minutes as needed for Chest pain., Disp: , Rfl:     ondansetron (ZOFRAN-ODT) 4 MG TbDL, , Disp: , Rfl:     paroxetine (PAXIL) 20 MG tablet, Take 20 mg by mouth once daily., Disp: , Rfl:     tamsulosin (FLOMAX) 0.4 mg Cap, Take 1 capsule by mouth once daily., Disp: , Rfl:     testosterone cypionate (DEPOTESTOTERONE CYPIONATE) 200 mg/mL injection, Inject 400 mg into the muscle every 14 (fourteen) days., Disp: , Rfl:     tiZANidine (ZANAFLEX) 4 MG tablet, Take by mouth., Disp: , Rfl:     traMADoL (ULTRAM) 50 mg tablet, Take 50 mg by mouth 3 (three) times daily as needed., Disp: , Rfl:   Meds reviewed and reconciled.       Review of Systems  "  Respiratory:  Positive for shortness of breath.    Cardiovascular:  Positive for chest pain, palpitations and leg swelling. Negative for orthopnea, claudication and PND.   Neurological:  Negative for dizziness, loss of consciousness and weakness.         Objective:   BP (!) 84/50 (BP Location: Left arm, Patient Position: Sitting)   Pulse 63   Ht 5' 9" (1.753 m)   Wt 75.1 kg (165 lb 9.6 oz)   SpO2 98%   BMI 24.45 kg/m²     Physical Exam  Vitals and nursing note reviewed.   Constitutional:       Appearance: Normal appearance. He is obese.   HENT:      Head: Normocephalic and atraumatic.   Neck:      Vascular: No carotid bruit.   Cardiovascular:      Rate and Rhythm: Normal rate and regular rhythm.      Pulses: Normal pulses.      Heart sounds: Normal heart sounds.   Pulmonary:      Effort: Pulmonary effort is normal.      Breath sounds: Normal breath sounds.   Abdominal:      Palpations: Abdomen is soft.   Musculoskeletal:      Cervical back: Neck supple.      Right lower leg: No edema.      Left lower leg: No edema.   Skin:     General: Skin is warm and dry.      Capillary Refill: Capillary refill takes less than 2 seconds.   Neurological:      Mental Status: He is alert.         Assessment:     1. SOB (shortness of breath)  CBC Auto Differential    Comprehensive Metabolic Panel    metoprolol succinate (TOPROL-XL) 25 MG 24 hr tablet      2. Chest pain, unspecified type  CBC Auto Differential    Comprehensive Metabolic Panel    metoprolol succinate (TOPROL-XL) 25 MG 24 hr tablet      3. Palpitations  CBC Auto Differential    Comprehensive Metabolic Panel    metoprolol succinate (TOPROL-XL) 25 MG 24 hr tablet      4. Spontaneous dissection of coronary artery        5. Essential hypertension              Plan:   Spontaneous dissection of coronary artery  [facility] 6/28/2023- Dr. Ro  Resolving spontaneous dissection of the prox RCA      Essential hypertension  bp 120/80 mmHg 6/14/2023 when seen by Dr. Ro   Today " "84/50 mmHg- asymptomatic  Patient states no issues with left femoral access site.     CBC, CMP today  Suspect volume depletion.   Patient went back to work last week and works in an un-air conditioned environment. The heat index last week most days was >100 degrees F.   If he is not dehydrated will decrease losartan to 25 mg po daily and continue metoprolol succinate at 25 mg po daily.    Chest pain  Chest pain has improved since C . He states, "just feels sore".       RTC with Dr. Ro in 4-6 weeks  "

## 2023-07-20 ENCOUNTER — OFFICE VISIT (OUTPATIENT)
Dept: CARDIOLOGY | Facility: CLINIC | Age: 48
End: 2023-07-20
Payer: OTHER GOVERNMENT

## 2023-07-20 VITALS
SYSTOLIC BLOOD PRESSURE: 118 MMHG | BODY MASS INDEX: 24.44 KG/M2 | WEIGHT: 165 LBS | HEIGHT: 69 IN | HEART RATE: 61 BPM | DIASTOLIC BLOOD PRESSURE: 78 MMHG | OXYGEN SATURATION: 97 %

## 2023-07-20 DIAGNOSIS — I25.42 SPONTANEOUS DISSECTION OF CORONARY ARTERY: ICD-10-CM

## 2023-07-20 DIAGNOSIS — R00.2 INTERMITTENT PALPITATIONS: ICD-10-CM

## 2023-07-20 DIAGNOSIS — I20.0 UNSTABLE ANGINA PECTORIS: Primary | ICD-10-CM

## 2023-07-20 DIAGNOSIS — I10 ESSENTIAL HYPERTENSION: ICD-10-CM

## 2023-07-20 DIAGNOSIS — Z72.0 TOBACCO ABUSE DISORDER: ICD-10-CM

## 2023-07-20 PROCEDURE — 99215 OFFICE O/P EST HI 40 MIN: CPT | Mod: PBBFAC | Performed by: INTERNAL MEDICINE

## 2023-07-20 PROCEDURE — 99214 PR OFFICE/OUTPT VISIT, EST, LEVL IV, 30-39 MIN: ICD-10-PCS | Mod: S$PBB,,, | Performed by: INTERNAL MEDICINE

## 2023-07-20 PROCEDURE — 99214 OFFICE O/P EST MOD 30 MIN: CPT | Mod: S$PBB,,, | Performed by: INTERNAL MEDICINE

## 2023-07-20 NOTE — PROGRESS NOTES
PCP: Scout Foster MD    Referring Provider:     Subjective:   CC: near syncope    HPI: Tyrel Stafford is a 48 y.o. male with hx of single vessel CAD with a resolving spontaneous dissection of  the prox RCA and AV fistula of the prox D1 found no [facility] 6/28/2023 by Dr. Ro and HTN, who presents for evaluation of near syncope last Monday, while carrying a cabinet, had to stop due to dizziness, lasted approximately a minute leaning up against trailer before symptoms passed.  He was seen in ER, found to have bradycardia, and metoprolol was discontinued.  He feels much better off meds.  Chest pain is much less severe, occurs much less frequently.  He is not monitoriong bps at home as directed.      He continues vaping, notes symptoms are worse if he is vaping more.    Fhx:  Family History   Problem Relation Age of Onset    Diabetes Mother     Cancer Mother     Hypertension Mother     Cancer Father     Diabetes Father     Hypertension Father      Shx:   Social History     Socioeconomic History    Marital status:    Tobacco Use    Smoking status: Former     Types: Vaping with nicotine    Smokeless tobacco: Never   Substance and Sexual Activity    Alcohol use: Never    Drug use: Never       EKG   Results for orders placed or performed during the hospital encounter of 06/28/23   EKG 12-lead    Collection Time: 06/28/23  7:34 AM    Narrative    Test Reason : Z01.818,    Vent. Rate : 046 BPM     Atrial Rate : 046 BPM     P-R Int : 124 ms          QRS Dur : 084 ms      QT Int : 466 ms       P-R-T Axes : 062 074 009 degrees     QTc Int : 407 ms    Sinus bradycardia  Otherwise normal ECG  When compared with ECG of 24-APR-2023 10:19,  Vent. rate has decreased BY  25 BPM  Confirmed by Josef Ro DO ([id number]) on 7/1/2023 9:06:08 PM    Referred By: JOSEF RO           Confirmed By:Josef Ro DO      ECHO Results for orders placed during the hospital encounter of 05/17/23    Echo    Interpretation Summary  · The  left ventricle is normal in size with low normal systolic function.  · The estimated ejection fraction is 50%.  · Normal left ventricular diastolic function.  · Normal right ventricular size with normal right ventricular systolic function.  · Mild tricuspid regurgitation.  · Mild pulmonic regurgitation.    [facility] Results for orders placed during the hospital encounter of 06/28/23    Cardiac catheterization    Conclusion    The ejection fraction was calculated to be 50%.    There was mild left ventricular systolic dysfunction.    The left ventricular end diastolic pressure was normal.    The pre-procedure left ventricular end diastolic pressure was 4.    The estimated blood loss was none.    There was single vessel coronary artery disease.    The procedure log was documented by No documenter listed and verified by Orlin Ro DO.    Date: 6/28/2023  Time: 10:04 AM    Mild LV systolic impairment with anterior apical hypokinesis  Resolving spontaneous dissection proximal RCA  AV fisutula proximal D!  Small native vessels.        Lab Results   Component Value Date     07/13/2023    K 3.3 (L) 07/13/2023     07/13/2023    CO2 28 07/13/2023    BUN 6 (L) 07/13/2023    CREATININE 1.42 (H) 07/13/2023    CALCIUM 9.6 07/13/2023    ANIONGAP 11 07/13/2023       No results found for: CHOL  No results found for: HDL  No results found for: LDLCALC  No results found for: TRIG  No results found for: CHOLHDL    Lab Results   Component Value Date    WBC 8.19 07/13/2023    HGB 14.3 07/13/2023    HCT 43.1 07/13/2023    MCV 92.5 07/13/2023     07/13/2023           Current Outpatient Medications:     amoxicillin-clavulanate 500-125mg (AUGMENTIN) 500-125 mg Tab, , Disp: , Rfl:     azithromycin (Z-ADRY) 250 MG tablet, , Disp: , Rfl:     cyanocobalamin 1,000 mcg/mL injection, Inject 1,000 mcg into the muscle every 30 days., Disp: , Rfl:     cyclobenzaprine (FLEXERIL) 10 MG tablet, Take 10 mg by mouth 3 (three) times daily as  needed., Disp: , Rfl:     doxycycline (MONODOX) 50 MG Cap, TAKE 2 CAPSULES BY MOUTH DAILY FOR 14 DAYS, Disp: , Rfl:     doxycycline (VIBRAMYCIN) 100 MG Cap, Take 100 mg by mouth once daily., Disp: , Rfl:     fluconazole (DIFLUCAN) 100 MG tablet, , Disp: , Rfl:     losartan (COZAAR) 50 MG tablet, Take 50 mg by mouth once daily., Disp: , Rfl:     methylPREDNISolone (MEDROL DOSEPACK) 4 mg tablet, , Disp: , Rfl:     metoprolol succinate (TOPROL-XL) 25 MG 24 hr tablet, Take 1 tablet (25 mg total) by mouth once daily. (Patient not taking: Reported on 7/20/2023), Disp: 30 tablet, Rfl: 2    montelukast (SINGULAIR) 10 mg tablet, Take 10 mg by mouth., Disp: , Rfl:     nitroGLYCERIN (NITROSTAT) 0.4 MG SL tablet, Place 1 tablet (0.4 mg total) under the tongue every 5 (five) minutes as needed for Chest pain., Disp: 25 tablet, Rfl: 0    nitroGLYCERIN (NITROSTAT) 0.4 MG SL tablet, Place 0.4 mg under the tongue every 5 (five) minutes as needed for Chest pain., Disp: , Rfl:     omeprazole (PRILOSEC) 20 MG capsule, Take 20 mg by mouth 2 (two) times a day., Disp: , Rfl:     ondansetron (ZOFRAN-ODT) 4 MG TbDL, , Disp: , Rfl:     paroxetine (PAXIL) 20 MG tablet, Take 20 mg by mouth once daily., Disp: , Rfl:     paroxetine (PAXIL) 40 MG tablet, Take 40 mg by mouth once daily., Disp: , Rfl:     tamsulosin (FLOMAX) 0.4 mg Cap, Take 1 capsule by mouth once daily., Disp: , Rfl:     testosterone cypionate (DEPOTESTOTERONE CYPIONATE) 200 mg/mL injection, Inject 400 mg into the muscle every 14 (fourteen) days., Disp: , Rfl:     tiZANidine (ZANAFLEX) 4 MG tablet, Take by mouth., Disp: , Rfl:     traMADoL (ULTRAM) 50 mg tablet, Take 50 mg by mouth 3 (three) times daily as needed., Disp: , Rfl:     traZODone (DESYREL) 100 MG tablet, Take 100 mg by mouth 2 (two) times daily., Disp: , Rfl:   Meds reviewed and reconciled.       Review of Systems   Respiratory:  Positive for shortness of breath.    Cardiovascular:  Positive for chest pain,  "palpitations and leg swelling. Negative for orthopnea, claudication and PND.   Neurological:  Negative for dizziness, loss of consciousness and weakness.         Objective:   /78 (BP Location: Left arm, Patient Position: Sitting)   Pulse 61   Ht 5' 9" (1.753 m)   Wt 74.8 kg (165 lb)   SpO2 97%   BMI 24.37 kg/m²     Physical Exam  Vitals and nursing note reviewed.   Constitutional:       Appearance: Normal appearance.   HENT:      Head: Normocephalic and atraumatic.   Neck:      Vascular: No carotid bruit.   Cardiovascular:      Rate and Rhythm: Normal rate and regular rhythm.      Pulses: Normal pulses.      Heart sounds: Normal heart sounds.   Pulmonary:      Effort: Pulmonary effort is normal.      Breath sounds: Normal breath sounds.   Abdominal:      Palpations: Abdomen is soft.   Musculoskeletal:      Cervical back: Neck supple.      Right lower leg: No edema.      Left lower leg: No edema.   Skin:     General: Skin is warm and dry.      Capillary Refill: Capillary refill takes less than 2 seconds.   Neurological:      Mental Status: He is alert.         Assessment:     1. Unstable angina pectoris        2. Essential hypertension        3. Spontaneous dissection of coronary artery        4. Tobacco abuse disorder              Plan:   Near syncope: resolved off beta blockade.  Non-obstructive CAD, remains pain free at normal levels of activty  Topbacco abuse, continues to vapoerAMA, discussed lifestlye chngnayely.   4.   Hyptn, adequately controlled on current meds.     RTC with Dr. Ro in 8 weeks    "

## 2023-09-19 ENCOUNTER — OFFICE VISIT (OUTPATIENT)
Dept: CARDIOLOGY | Facility: CLINIC | Age: 48
End: 2023-09-19
Payer: OTHER GOVERNMENT

## 2023-09-19 VITALS
BODY MASS INDEX: 25.48 KG/M2 | OXYGEN SATURATION: 96 % | DIASTOLIC BLOOD PRESSURE: 72 MMHG | WEIGHT: 172 LBS | HEART RATE: 60 BPM | HEIGHT: 69 IN | SYSTOLIC BLOOD PRESSURE: 112 MMHG

## 2023-09-19 DIAGNOSIS — R94.39 ABNORMAL STRESS TEST: ICD-10-CM

## 2023-09-19 DIAGNOSIS — I20.0 UNSTABLE ANGINA PECTORIS: ICD-10-CM

## 2023-09-19 DIAGNOSIS — I10 ESSENTIAL HYPERTENSION: Primary | ICD-10-CM

## 2023-09-19 DIAGNOSIS — I25.42 SPONTANEOUS DISSECTION OF CORONARY ARTERY: ICD-10-CM

## 2023-09-19 DIAGNOSIS — R00.2 INTERMITTENT PALPITATIONS: ICD-10-CM

## 2023-09-19 PROCEDURE — 99214 PR OFFICE/OUTPT VISIT, EST, LEVL IV, 30-39 MIN: ICD-10-PCS | Mod: S$PBB,,, | Performed by: INTERNAL MEDICINE

## 2023-09-19 PROCEDURE — 99214 OFFICE O/P EST MOD 30 MIN: CPT | Mod: S$PBB,,, | Performed by: INTERNAL MEDICINE

## 2023-09-19 PROCEDURE — 99215 OFFICE O/P EST HI 40 MIN: CPT | Mod: PBBFAC | Performed by: INTERNAL MEDICINE

## 2023-09-25 RX ORDER — METOPROLOL SUCCINATE 25 MG/1
12.5 TABLET, EXTENDED RELEASE ORAL DAILY
Qty: 15 TABLET | Refills: 5 | Status: SHIPPED | OUTPATIENT
Start: 2023-09-25

## 2023-09-25 NOTE — PROGRESS NOTES
PCP: Scout Irby MD    Referring Provider:     Subjective:   CC: near syncope    HPI: Tyrel Stafford is a 48 y.o. 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  PT  presented  for evaluation of near syncope early July, , while carrying a cabinet, had to stop due to dizziness, lasted approximately a minute leaning up against trailer before symptoms passed.  He was seen in ER, found to have bradycardia, and metoprolol was discontinued.  He feels much better off meds, dizziness has resolved, although palpitations have returned..  Chest pain is much less severe, occurs much less frequently.  He continues vaping, notes symptoms are worse if he is vaping more.    Fhx:  Family History   Problem Relation Age of Onset    Diabetes Mother     Cancer Mother     Hypertension Mother     Cancer Father     Diabetes Father     Hypertension Father      Shx:   Social History     Socioeconomic History    Marital status:    Tobacco Use    Smoking status: Former     Types: Vaping with nicotine    Smokeless tobacco: Never   Substance and Sexual Activity    Alcohol use: Never    Drug use: Never       EKG   Results for orders placed or performed during the hospital encounter of 06/28/23   EKG 12-lead    Collection Time: 06/28/23  7:34 AM    Narrative    Test Reason : Z01.818,    Vent. Rate : 046 BPM     Atrial Rate : 046 BPM     P-R Int : 124 ms          QRS Dur : 084 ms      QT Int : 466 ms       P-R-T Axes : 062 074 009 degrees     QTc Int : 407 ms    Sinus bradycardia  Otherwise normal ECG  When compared with ECG of 24-APR-2023 10:19,  Vent. rate has decreased BY  25 BPM  Confirmed by Josef York DO ([id number]) on 7/1/2023 9:06:08 PM    Referred By: JOSEF YORK           Confirmed By:Josef York DO      ECHO Results for orders placed during the hospital encounter of 05/17/23    Echo    Interpretation Summary  · The left  "ventricle is normal in size with low normal systolic function.  · The estimated ejection fraction is 50%.  · Normal left ventricular diastolic function.  · Normal right ventricular size with normal right ventricular systolic function.  · Mild tricuspid regurgitation.  · Mild pulmonic regurgitation.    [facility] Results for orders placed during the hospital encounter of 06/28/23    Cardiac catheterization    Conclusion    The ejection fraction was calculated to be 50%.    There was mild left ventricular systolic dysfunction.    The left ventricular end diastolic pressure was normal.    The pre-procedure left ventricular end diastolic pressure was 4.    The estimated blood loss was none.    There was single vessel coronary artery disease.    The procedure log was documented by No documenter listed and verified by Orlin Ro DO.    Date: 6/28/2023  Time: 10:04 AM    Mild LV systolic impairment with anterior apical hypokinesis  Resolving spontaneous dissection proximal RCA  AV fisutula proximal D!  Small native vessels.        Lab Results   Component Value Date     07/13/2023    K 3.3 (L) 07/13/2023     07/13/2023    CO2 28 07/13/2023    BUN 6 (L) 07/13/2023    CREATININE 1.42 (H) 07/13/2023    CALCIUM 9.6 07/13/2023    ANIONGAP 11 07/13/2023       No results found for: "CHOL"  No results found for: "HDL"  No results found for: "LDLCALC"  No results found for: "TRIG"  No results found for: "CHOLHDL"    Lab Results   Component Value Date    WBC 8.19 07/13/2023    HGB 14.3 07/13/2023    HCT 43.1 07/13/2023    MCV 92.5 07/13/2023     07/13/2023           Current Outpatient Medications:     amoxicillin-clavulanate 500-125mg (AUGMENTIN) 500-125 mg Tab, , Disp: , Rfl:     azithromycin (Z-ADRY) 250 MG tablet, , Disp: , Rfl:     cyanocobalamin 1,000 mcg/mL injection, Inject 1,000 mcg into the muscle every 30 days., Disp: , Rfl:     cyclobenzaprine (FLEXERIL) 10 MG tablet, Take 10 mg by mouth 3 (three) times " daily as needed., Disp: , Rfl:     doxycycline (MONODOX) 50 MG Cap, TAKE 2 CAPSULES BY MOUTH DAILY FOR 14 DAYS, Disp: , Rfl:     doxycycline (VIBRAMYCIN) 100 MG Cap, Take 100 mg by mouth once daily., Disp: , Rfl:     fluconazole (DIFLUCAN) 100 MG tablet, , Disp: , Rfl:     losartan (COZAAR) 50 MG tablet, Take 50 mg by mouth once daily., Disp: , Rfl:     methylPREDNISolone (MEDROL DOSEPACK) 4 mg tablet, , Disp: , Rfl:     montelukast (SINGULAIR) 10 mg tablet, Take 10 mg by mouth., Disp: , Rfl:     nitroGLYCERIN (NITROSTAT) 0.4 MG SL tablet, Place 1 tablet (0.4 mg total) under the tongue every 5 (five) minutes as needed for Chest pain., Disp: 25 tablet, Rfl: 0    nitroGLYCERIN (NITROSTAT) 0.4 MG SL tablet, Place 0.4 mg under the tongue every 5 (five) minutes as needed for Chest pain., Disp: , Rfl:     omeprazole (PRILOSEC) 20 MG capsule, Take 20 mg by mouth 2 (two) times a day., Disp: , Rfl:     ondansetron (ZOFRAN-ODT) 4 MG TbDL, , Disp: , Rfl:     paroxetine (PAXIL) 20 MG tablet, Take 20 mg by mouth once daily., Disp: , Rfl:     paroxetine (PAXIL) 40 MG tablet, Take 40 mg by mouth once daily., Disp: , Rfl:     tamsulosin (FLOMAX) 0.4 mg Cap, Take 1 capsule by mouth once daily., Disp: , Rfl:     testosterone cypionate (DEPOTESTOTERONE CYPIONATE) 200 mg/mL injection, Inject 400 mg into the muscle every 14 (fourteen) days., Disp: , Rfl:     tiZANidine (ZANAFLEX) 4 MG tablet, Take by mouth., Disp: , Rfl:     traMADoL (ULTRAM) 50 mg tablet, Take 50 mg by mouth 3 (three) times daily as needed., Disp: , Rfl:     traZODone (DESYREL) 100 MG tablet, Take 100 mg by mouth 2 (two) times daily., Disp: , Rfl:   Meds reviewed and reconciled.       Review of Systems   Respiratory:  Positive for shortness of breath.    Cardiovascular:  Positive for chest pain, palpitations and leg swelling. Negative for orthopnea, claudication and PND.   Neurological:  Negative for dizziness, loss of consciousness and weakness.           Objective:  "  /72 (BP Location: Left arm, Patient Position: Sitting)   Pulse 60   Ht 5' 9" (1.753 m)   Wt 78 kg (172 lb)   SpO2 96%   BMI 25.40 kg/m²     Physical Exam  Vitals and nursing note reviewed.   Constitutional:       Appearance: Normal appearance.   HENT:      Head: Normocephalic and atraumatic.   Neck:      Vascular: No carotid bruit.   Cardiovascular:      Rate and Rhythm: Normal rate and regular rhythm.      Pulses: Normal pulses.      Heart sounds: Normal heart sounds.   Pulmonary:      Effort: Pulmonary effort is normal.      Breath sounds: Normal breath sounds.   Abdominal:      Palpations: Abdomen is soft.   Musculoskeletal:      Cervical back: Neck supple.      Right lower leg: No edema.      Left lower leg: No edema.   Skin:     General: Skin is warm and dry.      Capillary Refill: Capillary refill takes less than 2 seconds.   Neurological:      Mental Status: He is alert.           Assessment:     1. Essential hypertension        2. Intermittent palpitations        3. Abnormal stress test        4. Spontaneous dissection of coronary artery        5. Unstable angina pectoris              Plan:   Near syncope: resolved off beta blockade.  Non-obstructive CAD, remains pain free at normal levels of activty  Topbacco abuse, continues to vapoerAMA, discussed lifestlye galo.   4.   Hyptn, adequately controlled on current meds.   5.  Palpitations are more severe off all beta blockade, will resume at lower dose, metoprolol succinate 12.5 mg q d, monitor symptoms, call clinic if not improved in two weeks    Follow up in six months, sooner if symptoms change.     "

## (undated) DEVICE — CUFF FLEXIPORT BP LONG ADULT

## (undated) DEVICE — DRESSING TRANS 4X4 TEGADERM

## (undated) DEVICE — CHLORAPREP 10.5 ML APPLICATOR

## (undated) DEVICE — SET IV PRIMARY

## (undated) DEVICE — Device

## (undated) DEVICE — SET EXTENSION CLEARLINK 2INJ

## (undated) DEVICE — KIT IV START WITH PREVANTICS

## (undated) DEVICE — CATH DIAG IMPULSE 6FR FL4

## (undated) DEVICE — GLOVE SURG ULTRA TOUCH 6

## (undated) DEVICE — DECANTER FLUID TRNSF WHITE 9IN

## (undated) DEVICE — OXISENSOR ADULT DIGIT N/S

## (undated) DEVICE — TOWEL OR DISP STRL BLUE 4/PK

## (undated) DEVICE — INTRODUCER KIT MICRO 4FR

## (undated) DEVICE — GLOVE PROTEXIS PI CRM 8

## (undated) DEVICE — SHEATH INTRODUCER 6FR 11CM

## (undated) DEVICE — ETCO2 NC MICROSTR FEM ST ADLT

## (undated) DEVICE — SET EXT CATH NONDEHP .8 6.5IN

## (undated) DEVICE — CONTRAST ISOVUE 370 100ML

## (undated) DEVICE — BOWL FLUID - BACK STOP

## (undated) DEVICE — CATH IV 20G 1.16 IN AUTOGARD

## (undated) DEVICE — GLOVE BIOGEL SKINSENSE PI 7.0

## (undated) DEVICE — PROTECTOR ULNAR NERVE FOAM

## (undated) DEVICE — CATH DIAG IMPULSE 6FR FR4

## (undated) DEVICE — GOWN NONREINF SET-IN SLV 2XL